# Patient Record
Sex: FEMALE | Race: WHITE | NOT HISPANIC OR LATINO | Employment: FULL TIME | URBAN - METROPOLITAN AREA
[De-identification: names, ages, dates, MRNs, and addresses within clinical notes are randomized per-mention and may not be internally consistent; named-entity substitution may affect disease eponyms.]

---

## 2017-01-09 ENCOUNTER — ALLSCRIPTS OFFICE VISIT (OUTPATIENT)
Dept: OTHER | Facility: OTHER | Age: 47
End: 2017-01-09

## 2017-01-09 DIAGNOSIS — R05.9 COUGH: ICD-10-CM

## 2017-01-09 DIAGNOSIS — R07.9 CHEST PAIN: ICD-10-CM

## 2017-01-10 ENCOUNTER — HOSPITAL ENCOUNTER (OUTPATIENT)
Dept: RADIOLOGY | Facility: HOSPITAL | Age: 47
Discharge: HOME | End: 2017-01-10
Payer: COMMERCIAL

## 2017-01-10 ENCOUNTER — TRANSCRIBE ORDERS (OUTPATIENT)
Dept: ADMINISTRATIVE | Facility: HOSPITAL | Age: 47
End: 2017-01-10

## 2017-01-10 DIAGNOSIS — R07.9 CHEST PAIN: ICD-10-CM

## 2017-01-10 DIAGNOSIS — R05.9 COUGH: ICD-10-CM

## 2017-01-10 LAB
FLUAV AG SPEC QL IA: NEGATIVE
INFLUENZA B AG (HISTORICAL): NEGATIVE

## 2017-01-10 PROCEDURE — 71020 HB CHEST X-RAY 2VW FRONTAL&LATL: CPT

## 2017-08-05 ENCOUNTER — TRANSCRIBE ORDERS (OUTPATIENT)
Dept: LAB | Facility: CLINIC | Age: 47
End: 2017-08-05

## 2017-08-05 ENCOUNTER — APPOINTMENT (OUTPATIENT)
Dept: LAB | Facility: CLINIC | Age: 47
End: 2017-08-05
Payer: COMMERCIAL

## 2017-08-05 DIAGNOSIS — Z00.8 HEALTH EXAMINATION IN POPULATION SURVEY: Primary | ICD-10-CM

## 2017-08-05 DIAGNOSIS — Z00.8 HEALTH EXAMINATION IN POPULATION SURVEY: ICD-10-CM

## 2017-08-05 LAB
CHOLEST SERPL-MCNC: 148 MG/DL (ref 50–200)
EST. AVERAGE GLUCOSE BLD GHB EST-MCNC: 128 MG/DL
HBA1C MFR BLD: 6.1 % (ref 4.2–6.3)
HDLC SERPL-MCNC: 49 MG/DL (ref 40–60)
LDLC SERPL CALC-MCNC: 89 MG/DL (ref 0–100)
TRIGL SERPL-MCNC: 48 MG/DL

## 2017-08-05 PROCEDURE — 36415 COLL VENOUS BLD VENIPUNCTURE: CPT

## 2017-08-05 PROCEDURE — 80061 LIPID PANEL: CPT

## 2017-08-05 PROCEDURE — 83036 HEMOGLOBIN GLYCOSYLATED A1C: CPT

## 2018-01-13 NOTE — RESULT NOTES
Verified Results  * US PELVIS COMPLETE (TRANSABDOMINAL AND TRANSVAGINAL) [id number] 01:00PM Danni Dowling     Test Name Result Flag Reference   US PELVIS COMPLETE (TRANSABDOMINAL AND TRANSVAGINAL) (Report)     PELVIC ULTRASOUND, COMPLETE     INDICATION: Evaluate IUD  Scheduled for removal           COMPARISON: 11/9/2013  TECHNIQUE:  Transabdominal pelvic ultrasound was performed in sagittal and transverse planes with a curvilinear transducer  Additional transvaginal imaging was performed to better evaluate the endometrium and ovaries  Imaging included volumetric    sweeps as well as traditional still imaging technique  FINDINGS:     UTERUS:   The uterus is anteverted in position, measuring 7 2 x 3 0 x 5 0 cm  Contour and echotexture appear normal  Incidental hyperechoic focus again noted within the myometrium  The cervix shows no suspicious abnormality  ENDOMETRIUM:    Normal caliber of 4 mm  IUD is present and in expected location within the endometrial canal    Homogenous and normal in appearance  OVARIES/ADNEXA:   Right ovary: 2 7 x 2 2 x 2 9 cm  No suspicious right ovarian abnormality  Doppler flow within normal limits  Left ovary: 2 0 x 1 0 x 1 8 cm  No suspicious left ovarian abnormality  Doppler flow within normal limits  No suspicious adnexal mass or loculated collections  There is no free fluid  IMPRESSION:      IUD present within expected location within the endometrial canal           Workstation performed: [id number]     Signed by:    Karla White MD   9/14/16

## 2018-01-14 VITALS
DIASTOLIC BLOOD PRESSURE: 80 MMHG | TEMPERATURE: 99.3 F | RESPIRATION RATE: 14 BRPM | SYSTOLIC BLOOD PRESSURE: 130 MMHG | WEIGHT: 236 LBS | BODY MASS INDEX: 34.35 KG/M2 | HEART RATE: 68 BPM

## 2018-06-19 ENCOUNTER — TELEPHONE (OUTPATIENT)
Dept: INTERNAL MEDICINE CLINIC | Facility: CLINIC | Age: 48
End: 2018-06-19

## 2018-06-19 DIAGNOSIS — Z98.84 STATUS POST GASTRIC BYPASS FOR OBESITY: ICD-10-CM

## 2018-06-19 DIAGNOSIS — E55.9 VITAMIN D DEFICIENCY: ICD-10-CM

## 2018-06-19 DIAGNOSIS — I10 HYPERTENSION, UNSPECIFIED TYPE: Primary | ICD-10-CM

## 2018-06-19 DIAGNOSIS — E78.5 HYPERLIPIDEMIA, UNSPECIFIED HYPERLIPIDEMIA TYPE: ICD-10-CM

## 2018-06-19 NOTE — TELEPHONE ENCOUNTER
She needs CBC with diff, chemistry profile, cholesterol, HDL, LDL, triglycerides, zinc level, copper level, PTH-N terminal, folic acid level, iron, ferritin -8 hour fast with diagnoses of hypertension, hyperlipidemia, status post gastric bypass surgery

## 2018-06-19 NOTE — TELEPHONE ENCOUNTER
PT called requesting a lab slip for her next appointment        Please advise  Write them down here with dx codes

## 2018-06-30 ENCOUNTER — TRANSCRIBE ORDERS (OUTPATIENT)
Dept: LAB | Facility: CLINIC | Age: 48
End: 2018-06-30

## 2018-06-30 ENCOUNTER — APPOINTMENT (OUTPATIENT)
Dept: LAB | Facility: CLINIC | Age: 48
End: 2018-06-30
Payer: COMMERCIAL

## 2018-06-30 DIAGNOSIS — I10 HYPERTENSION, UNSPECIFIED TYPE: ICD-10-CM

## 2018-06-30 DIAGNOSIS — Z00.8 HEALTH EXAMINATION IN POPULATION SURVEY: Primary | ICD-10-CM

## 2018-06-30 DIAGNOSIS — Z98.84 STATUS POST GASTRIC BYPASS FOR OBESITY: ICD-10-CM

## 2018-06-30 DIAGNOSIS — Z00.8 HEALTH EXAMINATION IN POPULATION SURVEY: ICD-10-CM

## 2018-06-30 DIAGNOSIS — E55.9 VITAMIN D DEFICIENCY: ICD-10-CM

## 2018-06-30 DIAGNOSIS — E78.5 HYPERLIPIDEMIA, UNSPECIFIED HYPERLIPIDEMIA TYPE: ICD-10-CM

## 2018-06-30 LAB
25(OH)D3 SERPL-MCNC: 20 NG/ML (ref 30–100)
ALBUMIN SERPL BCP-MCNC: 3.4 G/DL (ref 3.5–5)
ALP SERPL-CCNC: 99 U/L (ref 46–116)
ALT SERPL W P-5'-P-CCNC: 24 U/L (ref 12–78)
ANION GAP SERPL CALCULATED.3IONS-SCNC: 6 MMOL/L (ref 4–13)
AST SERPL W P-5'-P-CCNC: 17 U/L (ref 5–45)
BASOPHILS # BLD AUTO: 0.03 THOUSANDS/ΜL (ref 0–0.1)
BASOPHILS NFR BLD AUTO: 0 % (ref 0–1)
BILIRUB SERPL-MCNC: 0.7 MG/DL (ref 0.2–1)
BUN SERPL-MCNC: 11 MG/DL (ref 5–25)
CALCIUM SERPL-MCNC: 8.8 MG/DL (ref 8.3–10.1)
CHLORIDE SERPL-SCNC: 104 MMOL/L (ref 100–108)
CHOLEST SERPL-MCNC: 179 MG/DL (ref 50–200)
CO2 SERPL-SCNC: 30 MMOL/L (ref 21–32)
CREAT SERPL-MCNC: 0.68 MG/DL (ref 0.6–1.3)
EOSINOPHIL # BLD AUTO: 0.11 THOUSAND/ΜL (ref 0–0.61)
EOSINOPHIL NFR BLD AUTO: 2 % (ref 0–6)
ERYTHROCYTE [DISTWIDTH] IN BLOOD BY AUTOMATED COUNT: 14.8 % (ref 11.6–15.1)
EST. AVERAGE GLUCOSE BLD GHB EST-MCNC: 123 MG/DL
FERRITIN SERPL-MCNC: 8 NG/ML (ref 8–388)
FOLATE SERPL-MCNC: 14.9 NG/ML (ref 3.1–17.5)
GFR SERPL CREATININE-BSD FRML MDRD: 106 ML/MIN/1.73SQ M
GLUCOSE P FAST SERPL-MCNC: 97 MG/DL (ref 65–99)
HBA1C MFR BLD: 5.9 % (ref 4.2–6.3)
HCT VFR BLD AUTO: 38.5 % (ref 34.8–46.1)
HDLC SERPL-MCNC: 55 MG/DL (ref 40–60)
HGB BLD-MCNC: 12.3 G/DL (ref 11.5–15.4)
IRON SERPL-MCNC: 72 UG/DL (ref 50–170)
LDLC SERPL CALC-MCNC: 114 MG/DL (ref 0–100)
LDLC SERPL DIRECT ASSAY-MCNC: 109 MG/DL (ref 0–100)
LYMPHOCYTES # BLD AUTO: 2.15 THOUSANDS/ΜL (ref 0.6–4.47)
LYMPHOCYTES NFR BLD AUTO: 30 % (ref 14–44)
MCH RBC QN AUTO: 27.5 PG (ref 26.8–34.3)
MCHC RBC AUTO-ENTMCNC: 31.9 G/DL (ref 31.4–37.4)
MCV RBC AUTO: 86 FL (ref 82–98)
MONOCYTES # BLD AUTO: 0.43 THOUSAND/ΜL (ref 0.17–1.22)
MONOCYTES NFR BLD AUTO: 6 % (ref 4–12)
NEUTROPHILS # BLD AUTO: 4.52 THOUSANDS/ΜL (ref 1.85–7.62)
NEUTS SEG NFR BLD AUTO: 63 % (ref 43–75)
NONHDLC SERPL-MCNC: 124 MG/DL
PLATELET # BLD AUTO: 278 THOUSANDS/UL (ref 149–390)
PMV BLD AUTO: 12.2 FL (ref 8.9–12.7)
POTASSIUM SERPL-SCNC: 3.9 MMOL/L (ref 3.5–5.3)
PROT SERPL-MCNC: 7 G/DL (ref 6.4–8.2)
PTH-INTACT SERPL-MCNC: 63.7 PG/ML (ref 18.4–80.1)
RBC # BLD AUTO: 4.48 MILLION/UL (ref 3.81–5.12)
SODIUM SERPL-SCNC: 140 MMOL/L (ref 136–145)
TRIGL SERPL-MCNC: 50 MG/DL
WBC # BLD AUTO: 7.24 THOUSAND/UL (ref 4.31–10.16)

## 2018-06-30 PROCEDURE — 83721 ASSAY OF BLOOD LIPOPROTEIN: CPT

## 2018-06-30 PROCEDURE — 82306 VITAMIN D 25 HYDROXY: CPT

## 2018-06-30 PROCEDURE — 80061 LIPID PANEL: CPT

## 2018-06-30 PROCEDURE — 83970 ASSAY OF PARATHORMONE: CPT

## 2018-06-30 PROCEDURE — 36415 COLL VENOUS BLD VENIPUNCTURE: CPT

## 2018-06-30 PROCEDURE — 83036 HEMOGLOBIN GLYCOSYLATED A1C: CPT

## 2018-06-30 PROCEDURE — 85025 COMPLETE CBC W/AUTO DIFF WBC: CPT

## 2018-06-30 PROCEDURE — 82728 ASSAY OF FERRITIN: CPT

## 2018-06-30 PROCEDURE — 82525 ASSAY OF COPPER: CPT

## 2018-06-30 PROCEDURE — 84630 ASSAY OF ZINC: CPT

## 2018-06-30 PROCEDURE — 82746 ASSAY OF FOLIC ACID SERUM: CPT

## 2018-06-30 PROCEDURE — 83540 ASSAY OF IRON: CPT

## 2018-06-30 PROCEDURE — 80053 COMPREHEN METABOLIC PANEL: CPT

## 2018-07-03 LAB
COPPER SERPL-MCNC: 121 UG/DL (ref 72–166)
ZINC SERPL-MCNC: 72 UG/DL (ref 56–134)

## 2018-07-11 ENCOUNTER — OFFICE VISIT (OUTPATIENT)
Dept: INTERNAL MEDICINE CLINIC | Facility: CLINIC | Age: 48
End: 2018-07-11
Payer: COMMERCIAL

## 2018-07-11 VITALS
RESPIRATION RATE: 14 BRPM | HEART RATE: 72 BPM | DIASTOLIC BLOOD PRESSURE: 78 MMHG | SYSTOLIC BLOOD PRESSURE: 128 MMHG | BODY MASS INDEX: 37.18 KG/M2 | WEIGHT: 255.4 LBS

## 2018-07-11 DIAGNOSIS — D64.9 ANEMIA, UNSPECIFIED TYPE: ICD-10-CM

## 2018-07-11 DIAGNOSIS — E61.1 IRON DEFICIENCY: Chronic | ICD-10-CM

## 2018-07-11 DIAGNOSIS — Z98.84 STATUS POST GASTRIC BYPASS FOR OBESITY: Primary | ICD-10-CM

## 2018-07-11 DIAGNOSIS — E78.5 HYPERLIPIDEMIA, UNSPECIFIED HYPERLIPIDEMIA TYPE: ICD-10-CM

## 2018-07-11 DIAGNOSIS — R73.03 PREDIABETES: ICD-10-CM

## 2018-07-11 DIAGNOSIS — R73.03 PRE-DIABETES: Chronic | ICD-10-CM

## 2018-07-11 PROCEDURE — 99214 OFFICE O/P EST MOD 30 MIN: CPT | Performed by: INTERNAL MEDICINE

## 2018-07-11 PROCEDURE — 1036F TOBACCO NON-USER: CPT | Performed by: INTERNAL MEDICINE

## 2018-07-11 NOTE — PROGRESS NOTES
Assessment/Plan:  1  Difficulty hearing-she will potentially be scheduling audiogram with ENT physician  2  Previously noted right upper quadrant pain  Ultrasound negative  White count was mildly elevated  CT scan showed changes of hepatic flexure-rule out atypical presentation diverticulitis  Follow-up colonoscopy shows mild diverticular disease  3-diverticulosis-follow-up colonoscopy due 10 years after her last-she follows appropriate diet  4  Previous noted abnormal CT scan with several lumbar vertebral bodies  Unchanged when compared to CT scan a decade prior  Radiology felt there were no discrete bony lytic lesions in that they were more likely potential vertebral body hemangiomas  MRI of lumbar spine shows multilevel degenerative changes, transitional lumbosacral junction changes, foraminal stenosis and marrow abnormalities unchanged dating back to 2004  5  Previously described other abdominal pain  Has had prior May-en-Y gastric bypass surgery  Ultrasound negative  Labs normal   I was worried about potential marginal ulcer-she took PPI for 2 months in all symptoms resolved and has not recurred  6  Hypertension-not an issue since her bypass surgery  7  Hyperlipidemia-resolved since her bypass surgery  8  Fatty liver-liver enzymes normal with weight loss post gastric bypass surgery  9  Iron deficiency-associated with post gastric bypass-she will take her vitamin-C every other day with supplemental vitamin Celsius  10  Vitamin-D deficiency-associated with her gastric bypass-she will take 2000 units daily +50 1000 units monthly  11  Vitamin B12 deficiency-on supplement  12    Pre diabetes-she knows further weight loss needed    All other problems as per note November 2014      MEDICAL REGIMEN:      Multivitamin, ferrous sulfate 325 milligrams every other day, vitamin-C 500 milligrams every other day, vitamin D3/2000 units daily, vitamin-D/25839 units monthly    Appointment in 12 months with prior chemistry profile cholesterol profile hemoglobin A1c, iron, ferritin, PTH level, copper, zinc, PTH level, vitamin B1, vitamin B6, vitamin B12 level, copper level, zinc level  No problem-specific Assessment & Plan notes found for this encounter  Diagnoses and all orders for this visit:    Status post gastric bypass for obesity  -     CBC and differential; Future  -     Comprehensive metabolic panel; Future  -     HEMOGLOBIN A1C W/ EAG ESTIMATION; Future  -     Cholesterol, total; Future  -     Triglycerides; Future  -     HDL cholesterol; Future  -     LDL cholesterol, direct; Future  -     TSH, 3rd generation; Future  -     Vitamin B12; Future  -     Ferritin; Future  -     Iron; Future  -     Vitamin D 25 hydroxy; Future  -     PTH, intact; Future  -     Vitamin B1, whole blood; Future  -     Vitamin B6; Future  -     Copper Level; Future  -     Zinc; Future    Prediabetes  -     CBC and differential; Future  -     Comprehensive metabolic panel; Future  -     HEMOGLOBIN A1C W/ EAG ESTIMATION; Future  -     Cholesterol, total; Future  -     Triglycerides; Future  -     HDL cholesterol; Future  -     LDL cholesterol, direct; Future  -     TSH, 3rd generation; Future  -     Vitamin B12; Future  -     Ferritin; Future  -     Iron; Future  -     Vitamin D 25 hydroxy; Future  -     PTH, intact; Future  -     Vitamin B1, whole blood; Future  -     Vitamin B6; Future  -     Copper Level; Future  -     Zinc; Future    Hyperlipidemia, unspecified hyperlipidemia type  -     CBC and differential; Future  -     Comprehensive metabolic panel; Future  -     HEMOGLOBIN A1C W/ EAG ESTIMATION; Future  -     Cholesterol, total; Future  -     Triglycerides; Future  -     HDL cholesterol; Future  -     LDL cholesterol, direct; Future  -     TSH, 3rd generation; Future  -     Vitamin B12; Future  -     Ferritin; Future  -     Iron; Future  -     Vitamin D 25 hydroxy; Future  -     PTH, intact;  Future  -     Vitamin B1, whole blood; Future  -     Vitamin B6; Future  -     Copper Level; Future  -     Zinc; Future    Anemia, unspecified type    Iron deficiency    Pre-diabetes          Subjective:      Patient ID: Susie Mccormack is a 41 y o  female  She is overall relatively stable  She has deficiencies associated with her post gastric bypass state  She is low on vitamin-D but has not been completely compliant with taking her supplement  She will be on vitamin D3/2000 units a day +50 1000 units monthly  In addition she is deficient on iron  We reviewed the literature showing iron absorption is better with every other day supplement and she will be doing this  She will supplement with vitamin-C  Labs done prior to this visit show cholesterol 179 triglycerides 50 HDL 55  A1c 5 9 vitamin-D low at 20 ferritin is equal to 8 iron 72 folate 14 9 PTH is normal at 63 7 copper is normal zinc is normal   She is aware the remote potential for heavy metal deficiency status post gastric bypass  Checking heavy metal levels intermittently    She has a history of hypertension  This is resolved since her bypass surgery  No evidence of this today  She has a history of fatty liver with elevated liver enzymes-these normalized with weight loss post gastric bypass  Liver enzymes prior to this visit normal      This patient denies any systemic symptoms  Specifically there has been no evidence of fever, night sweats, significant weight loss or significant decrease in appetite  We reviewed the literature showing week of iron deficiency with decreased hearing  She is having some issues with hearing and will be potentially seen the ENT for repeat audiogram   Audiogram in the past was normal   She says she has trouble hearing in crowds no trouble hearing on the phone which she can concentrate          The following portions of the patient's history were reviewed and updated as appropriate: current medications, past family history, past medical history, past social history, past surgical history and problem list     Review of Systems   Constitutional: Negative  HENT: Positive for hearing loss  Respiratory: Negative  Cardiovascular: Negative  Gastrointestinal: Negative  Endocrine: Negative  Genitourinary: Negative  Musculoskeletal: Negative  Neurological: Negative  Hematological: Negative  Psychiatric/Behavioral: Negative  Objective:      /78   Pulse 72   Resp 14   Wt 116 kg (255 lb 6 4 oz)   BMI 37 18 kg/m²          Physical Exam   Constitutional: She is oriented to person, place, and time  She appears well-developed and well-nourished  No distress  HENT:   Head: Normocephalic and atraumatic  Right Ear: External ear normal    Left Ear: External ear normal    Nose: Nose normal    Mouth/Throat: Oropharynx is clear and moist  No oropharyngeal exudate  Eyes: Conjunctivae and EOM are normal  Pupils are equal, round, and reactive to light  Right eye exhibits no discharge  Left eye exhibits no discharge  No scleral icterus  Neck: Normal range of motion  Neck supple  No JVD present  No tracheal deviation present  No thyromegaly present  Cardiovascular: Normal rate, regular rhythm, normal heart sounds and intact distal pulses  Exam reveals no gallop and no friction rub  No murmur heard  Pulmonary/Chest: Effort normal and breath sounds normal  No respiratory distress  She has no wheezes  She has no rales  She exhibits no tenderness  Abdominal: Soft  Bowel sounds are normal  She exhibits no distension and no mass  There is no tenderness  There is no rebound and no guarding  Musculoskeletal: Normal range of motion  She exhibits no edema or deformity  Lymphadenopathy:     She has no cervical adenopathy  Neurological: She is alert and oriented to person, place, and time  She has normal reflexes  No cranial nerve deficit  She exhibits normal muscle tone   Coordination normal    Skin: Skin is warm and dry  No rash noted  No erythema  Psychiatric: She has a normal mood and affect  Her behavior is normal  Judgment and thought content normal    Vitals reviewed

## 2019-02-22 ENCOUNTER — APPOINTMENT (OUTPATIENT)
Dept: RADIOLOGY | Facility: CLINIC | Age: 49
End: 2019-02-22
Payer: COMMERCIAL

## 2019-02-22 ENCOUNTER — OFFICE VISIT (OUTPATIENT)
Dept: OBGYN CLINIC | Facility: CLINIC | Age: 49
End: 2019-02-22
Payer: COMMERCIAL

## 2019-02-22 VITALS
SYSTOLIC BLOOD PRESSURE: 139 MMHG | HEART RATE: 93 BPM | WEIGHT: 262.8 LBS | BODY MASS INDEX: 37.62 KG/M2 | HEIGHT: 70 IN | DIASTOLIC BLOOD PRESSURE: 90 MMHG

## 2019-02-22 DIAGNOSIS — M72.2 PLANTAR FASCIITIS: ICD-10-CM

## 2019-02-22 DIAGNOSIS — M79.672 PAIN IN LEFT FOOT: ICD-10-CM

## 2019-02-22 DIAGNOSIS — S99.922A INJURY OF PLANTAR PLATE OF LEFT FOOT, INITIAL ENCOUNTER: Primary | ICD-10-CM

## 2019-02-22 DIAGNOSIS — M79.671 HEEL PAIN, BILATERAL: ICD-10-CM

## 2019-02-22 DIAGNOSIS — M79.672 HEEL PAIN, BILATERAL: ICD-10-CM

## 2019-02-22 PROCEDURE — 99203 OFFICE O/P NEW LOW 30 MIN: CPT | Performed by: ORTHOPAEDIC SURGERY

## 2019-02-22 PROCEDURE — 73630 X-RAY EXAM OF FOOT: CPT

## 2019-02-22 RX ORDER — ERGOCALCIFEROL 1.25 MG/1
CAPSULE ORAL
COMMUNITY
Start: 2019-01-08 | End: 2020-07-09 | Stop reason: SDUPTHER

## 2019-02-22 RX ORDER — ERGOCALCIFEROL 1.25 MG/1
CAPSULE ORAL
COMMUNITY
Start: 2011-03-21 | End: 2020-07-09

## 2019-02-22 NOTE — PROGRESS NOTES
MARIMAR Peterson  Attending, Orthopaedic Surgery  [facility] [address] [facility]      ORTHOPAEDIC FOOT [facility]KLE CLINIC VISIT     Assessment:     Encounter Diagnoses   Name Primary?  Pain in left foot     Heel pain, bilateral     Injury of plantar plate of left foot, initial encounter Yes    Plantar fasciitis             Plan:   · The patient verbalized understanding of exam findings and treatment plan  We engaged in the shared decision-making process and treatment options were discussed at length with the patient  Surgical and conservative management discussed today along with risks and benefits  · She is to wear a budin splint on left 2nd toe with proper shoe wear  Her 2nd toe pain should improve with this treatment  · She does not have typical pain for plantar fasciitis, her pain is directly medial on the heel and not plantar  If conservative treatment does not alleviate her symptoms we will consider MRI to evaluate the area  · She is to wear visco heel cups  · Activity to tolerance  · She may take NSAIDS for pain control as needed  Return in about 3 months (around 5/22/2019) for Recheck left foot 2nd toe, plantar plate injury  Kin Puente History of Present Illness:   Chief Complaint:   Chief Complaint   Patient presents with   [address] [place] [address] is a 57 y o  female who is being seen for left foot pain x 3 months  She denies any injury mechansim  Pain is localized at 2nd toe and medial heel with minimal radiating and described as sharp and severe  Patient denies numbness, tingling or radicular pain  Denies history of neuropathy  Patient does not smoke, does not have diabetes and does not take blood thinners  Patient denies family history of anesthesia complications and has not had any complications with anesthesia       Pain/symptom timing:  Worse during the day when active  Pain/symptom context:  Worse with activites and work  Pain/symptom modifying factors:  Rest makes better, activities make worse  Pain/symptom associated signs/symptoms: none    Prior treatment   · NSAIDsYes   · Injections No   · Bracing/Orthotics No    · Physical Therapy No     Orthopedic Surgical History:   See below    Past Medical, Surgical and Social History:  Past Medical History:  has a past medical history of Controlled diabetes mellitus ([facility] [address] ) and Long term use of drug  Problem List: does not have any pertinent problems on file  Past Surgical History:  has a past surgical history that includes Gastric bypass (03/29/2011) and Tonsillectomy  Family History: family history is not on file  Social History:  reports that she has never smoked  She has never used smokeless tobacco  She reports that she does not drink alcohol  Her drug history is not on file  Current Medications: has a current medication list which includes the following prescription(s): ergocalciferol, ergocalciferol, and multiple vitamin  Allergies: has No Known Allergies  Review of Systems:  General- denies fever/chills  HEENT- denies hearing loss or sore throat  Eyes- denies eye pain or visual disturbances, denies red eyes  Respiratory- denies cough or SOB  Cardio- denies chest pain or palpitations  GI- denies abdominal pain  Endocrine- denies urinary frequency  Urinary- denies pain with urination  Musculoskeletal- Negative except noted above  Skin- denies rashes or wounds  Neurological- denies dizziness or headache  Psychiatric- denies anxiety or difficulty concentrating    Physical Exam:   /90 (BP Location: Right arm, Patient Position: Sitting, Cuff Size: Adult)   Pulse 93   Ht 5' 9 5" (1 765 m)   Wt 119 kg (262 lb 12 8 oz)   BMI 38 25 kg/m²   General/Constitutional: No apparent distress: well-nourished and well developed    Eyes: normal ocular motion  Lymphatic: No appreciable lymphadenopathy  Respiratory: Non-labored breathing  Vascular: No edema, swelling or tenderness, except as noted in detailed exam   Integumentary: No impressive skin lesions present, except as noted in detailed exam   Neuro: No ataxia or tremors noted  Psych: Normal mood and affect, oriented to person, place and time  Appropriate affect  Musculoskeletal: Normal, except as noted in detailed exam and in HPI  Examination    Left    Gait Normal   Musculoskeletal Tender to palpation at 2nd toe proximal phalanx  Direct medial heel pain  Skin   Callus formation present of the 5th mtpj, base of the 5th metatarsal, plantar  Nails Normal    Range of Motion  full degrees dorsiflexion, full degrees plantarflexion of the 2nd mtpj  Subtalar motion: wnl    Stability Stable    Muscle Strength 5/5 tibialis anterior  5/5 gastrocnemius-soleus  5/5 posterior tibialis  5/5 peroneal/eversion strength  5/5 EHL  5/5 FHL    Neurologic Normal    Sensation Intact to light touch throughout sural, saphenous, superficial peroneal, deep peroneal and medial/lateral plantar nerve distributions  Macon-Gena 5 07 filament (10g) testing deferred  Cardiovascular Brisk capillary refill < 2 seconds,intact DP and PT pulses    Special Tests Lamont's Click:  Negative of the 2nd web  Anterior drawer 2nd toe:  negative      Imaging Studies:   3 views of the left foot were taken, reviewed and interpreted independently that demonstrate no fracture or dislocation, pes planus foot structure with Meary's Line of 15 6 degrees  Reviewed by me personally  Scribe Attestation    I,:   Ashlyn Chapman am acting as a scribe while in the presence of the attending physician :        I,:   Nicole Rose MD personally performed the services described in this documentation    as scribed in my presence :            Cleon Ginsberg Lachman, MD  Foot & Ankle Surgery   Department [address]      I personally performed the service  Cleon Ginsberg Lachman, MD

## 2019-04-01 PROBLEM — L98.9: Status: ACTIVE | Noted: 2019-04-01

## 2019-04-01 PROCEDURE — 88305 TISSUE EXAM BY PATHOLOGIST: CPT | Performed by: PATHOLOGY

## 2019-07-03 ENCOUNTER — APPOINTMENT (OUTPATIENT)
Dept: LAB | Facility: CLINIC | Age: 49
End: 2019-07-03
Payer: COMMERCIAL

## 2019-07-03 DIAGNOSIS — R73.03 PREDIABETES: ICD-10-CM

## 2019-07-03 DIAGNOSIS — Z98.84 STATUS POST GASTRIC BYPASS FOR OBESITY: ICD-10-CM

## 2019-07-03 DIAGNOSIS — E78.5 HYPERLIPIDEMIA, UNSPECIFIED HYPERLIPIDEMIA TYPE: ICD-10-CM

## 2019-07-03 LAB
25(OH)D3 SERPL-MCNC: 27.6 NG/ML (ref 30–100)
ALBUMIN SERPL BCP-MCNC: 3.7 G/DL (ref 3.5–5)
ALP SERPL-CCNC: 101 U/L (ref 46–116)
ALT SERPL W P-5'-P-CCNC: 24 U/L (ref 12–78)
ANION GAP SERPL CALCULATED.3IONS-SCNC: 11 MMOL/L (ref 4–13)
AST SERPL W P-5'-P-CCNC: 20 U/L (ref 5–45)
BASOPHILS # BLD AUTO: 0.03 THOUSANDS/ΜL (ref 0–0.1)
BASOPHILS NFR BLD AUTO: 0 % (ref 0–1)
BILIRUB SERPL-MCNC: 0.6 MG/DL (ref 0.2–1)
BUN SERPL-MCNC: 9 MG/DL (ref 5–25)
CALCIUM SERPL-MCNC: 8.8 MG/DL (ref 8.3–10.1)
CHLORIDE SERPL-SCNC: 104 MMOL/L (ref 100–108)
CHOLEST SERPL-MCNC: 162 MG/DL (ref 50–200)
CO2 SERPL-SCNC: 24 MMOL/L (ref 21–32)
CREAT SERPL-MCNC: 0.76 MG/DL (ref 0.6–1.3)
EOSINOPHIL # BLD AUTO: 0.1 THOUSAND/ΜL (ref 0–0.61)
EOSINOPHIL NFR BLD AUTO: 1 % (ref 0–6)
ERYTHROCYTE [DISTWIDTH] IN BLOOD BY AUTOMATED COUNT: 13.4 % (ref 11.6–15.1)
EST. AVERAGE GLUCOSE BLD GHB EST-MCNC: 120 MG/DL
FERRITIN SERPL-MCNC: 13 NG/ML (ref 8–388)
GFR SERPL CREATININE-BSD FRML MDRD: 94 ML/MIN/1.73SQ M
GLUCOSE P FAST SERPL-MCNC: 107 MG/DL (ref 65–99)
HBA1C MFR BLD: 5.8 % (ref 4.2–6.3)
HCT VFR BLD AUTO: 43.6 % (ref 34.8–46.1)
HDLC SERPL-MCNC: 50 MG/DL (ref 40–60)
HGB BLD-MCNC: 14.1 G/DL (ref 11.5–15.4)
IMM GRANULOCYTES # BLD AUTO: 0.03 THOUSAND/UL (ref 0–0.2)
IMM GRANULOCYTES NFR BLD AUTO: 0 % (ref 0–2)
IRON SERPL-MCNC: 88 UG/DL (ref 50–170)
LDLC SERPL DIRECT ASSAY-MCNC: 105 MG/DL (ref 0–100)
LYMPHOCYTES # BLD AUTO: 1.94 THOUSANDS/ΜL (ref 0.6–4.47)
LYMPHOCYTES NFR BLD AUTO: 19 % (ref 14–44)
MCH RBC QN AUTO: 28.8 PG (ref 26.8–34.3)
MCHC RBC AUTO-ENTMCNC: 32.3 G/DL (ref 31.4–37.4)
MCV RBC AUTO: 89 FL (ref 82–98)
MONOCYTES # BLD AUTO: 0.57 THOUSAND/ΜL (ref 0.17–1.22)
MONOCYTES NFR BLD AUTO: 6 % (ref 4–12)
NEUTROPHILS # BLD AUTO: 7.5 THOUSANDS/ΜL (ref 1.85–7.62)
NEUTS SEG NFR BLD AUTO: 74 % (ref 43–75)
NRBC BLD AUTO-RTO: 0 /100 WBCS
PLATELET # BLD AUTO: 261 THOUSANDS/UL (ref 149–390)
PMV BLD AUTO: 12 FL (ref 8.9–12.7)
POTASSIUM SERPL-SCNC: 4.4 MMOL/L (ref 3.5–5.3)
PROT SERPL-MCNC: 7.6 G/DL (ref 6.4–8.2)
PTH-INTACT SERPL-MCNC: 48.1 PG/ML (ref 18.4–80.1)
RBC # BLD AUTO: 4.89 MILLION/UL (ref 3.81–5.12)
SODIUM SERPL-SCNC: 139 MMOL/L (ref 136–145)
TRIGL SERPL-MCNC: 62 MG/DL
TSH SERPL DL<=0.05 MIU/L-ACNC: 3.54 UIU/ML (ref 0.36–3.74)
VIT B12 SERPL-MCNC: 208 PG/ML (ref 100–900)
WBC # BLD AUTO: 10.17 THOUSAND/UL (ref 4.31–10.16)

## 2019-07-03 PROCEDURE — 84425 ASSAY OF VITAMIN B-1: CPT

## 2019-07-03 PROCEDURE — 83036 HEMOGLOBIN GLYCOSYLATED A1C: CPT

## 2019-07-03 PROCEDURE — 82607 VITAMIN B-12: CPT

## 2019-07-03 PROCEDURE — 83970 ASSAY OF PARATHORMONE: CPT

## 2019-07-03 PROCEDURE — 83721 ASSAY OF BLOOD LIPOPROTEIN: CPT

## 2019-07-03 PROCEDURE — 36415 COLL VENOUS BLD VENIPUNCTURE: CPT

## 2019-07-03 PROCEDURE — 80061 LIPID PANEL: CPT

## 2019-07-03 PROCEDURE — 84630 ASSAY OF ZINC: CPT

## 2019-07-03 PROCEDURE — 82525 ASSAY OF COPPER: CPT

## 2019-07-03 PROCEDURE — 84443 ASSAY THYROID STIM HORMONE: CPT

## 2019-07-03 PROCEDURE — 85025 COMPLETE CBC W/AUTO DIFF WBC: CPT

## 2019-07-03 PROCEDURE — 84207 ASSAY OF VITAMIN B-6: CPT

## 2019-07-03 PROCEDURE — 83540 ASSAY OF IRON: CPT

## 2019-07-03 PROCEDURE — 82306 VITAMIN D 25 HYDROXY: CPT

## 2019-07-03 PROCEDURE — 82728 ASSAY OF FERRITIN: CPT

## 2019-07-03 PROCEDURE — 80053 COMPREHEN METABOLIC PANEL: CPT

## 2019-07-06 LAB
COPPER SERPL-MCNC: 89 UG/DL (ref 72–166)
ZINC SERPL-MCNC: 73 UG/DL (ref 56–134)

## 2019-07-09 LAB
VIT B1 BLD-SCNC: 138.6 NMOL/L (ref 66.5–200)
VIT B6 SERPL-MCNC: 3.3 UG/L (ref 2–32.8)

## 2019-07-10 ENCOUNTER — OFFICE VISIT (OUTPATIENT)
Dept: INTERNAL MEDICINE CLINIC | Facility: CLINIC | Age: 49
End: 2019-07-10
Payer: COMMERCIAL

## 2019-07-10 ENCOUNTER — TELEPHONE (OUTPATIENT)
Dept: INTERNAL MEDICINE CLINIC | Facility: CLINIC | Age: 49
End: 2019-07-10

## 2019-07-10 VITALS
HEART RATE: 78 BPM | SYSTOLIC BLOOD PRESSURE: 120 MMHG | HEIGHT: 70 IN | BODY MASS INDEX: 37.68 KG/M2 | WEIGHT: 263.2 LBS | DIASTOLIC BLOOD PRESSURE: 70 MMHG | RESPIRATION RATE: 14 BRPM

## 2019-07-10 DIAGNOSIS — C44.619 BASAL CELL CARCINOMA (BCC) OF SKIN OF LEFT UPPER EXTREMITY INCLUDING SHOULDER: ICD-10-CM

## 2019-07-10 DIAGNOSIS — E78.5 HYPERLIPIDEMIA, UNSPECIFIED HYPERLIPIDEMIA TYPE: Chronic | ICD-10-CM

## 2019-07-10 DIAGNOSIS — D72.829 LEUKOCYTOSIS, UNSPECIFIED TYPE: ICD-10-CM

## 2019-07-10 DIAGNOSIS — Z12.39 BREAST CANCER SCREENING: ICD-10-CM

## 2019-07-10 DIAGNOSIS — R73.03 PRE-DIABETES: Chronic | ICD-10-CM

## 2019-07-10 DIAGNOSIS — E61.1 IRON DEFICIENCY: Chronic | ICD-10-CM

## 2019-07-10 DIAGNOSIS — Z98.84 STATUS POST GASTRIC BYPASS FOR OBESITY: Primary | Chronic | ICD-10-CM

## 2019-07-10 PROCEDURE — 99214 OFFICE O/P EST MOD 30 MIN: CPT | Performed by: INTERNAL MEDICINE

## 2019-07-10 RX ORDER — FERROUS SULFATE 325(65) MG
325 TABLET ORAL EVERY OTHER DAY
COMMUNITY
End: 2020-07-09 | Stop reason: SDUPTHER

## 2019-07-10 NOTE — PROGRESS NOTES
Assessment/Plan:   1  Basal cell carcinoma left forearm -as noted pathology read as superficial basal cell carcinoma with small foci of tumor seen at 1 of the tips of the ellipse-other margins free of tremor -she is concerned about potential for other issues and I recommended formal evaluation by Dermatology  2  Difficulty hearing -we had recommended audiogram with ENT valve but she defers  We reviewed potential exacerbation by iron deficiency -she will be more thorough with her iron supplementation  3  Previously noted right upper quadrant pain  Ultrasound negative  White count was mildly elevated  CT scan showed changes of hepatic flexure-rule out atypical presentation diverticulitis  Follow-up colonoscopy shows mild diverticular disease  1-hoyrwmssbyttmv-wsvuwn-up colonoscopy due 10 years after her last-she follows appropriate diet  5  Previous noted abnormal CT scan with several lumbar vertebral bodies  Unchanged when compared to CT scan a decade prior  Radiology felt there were no discrete bony lytic lesions in that they were more likely potential vertebral body hemangiomas  MRI of lumbar spine shows multilevel degenerative changes, transitional lumbosacral junction changes, foraminal stenosis and marrow abnormalities unchanged dating back to 2004  6  Previously described other abdominal pain  Has had prior May-en-Y gastric bypass surgery  Ultrasound negative  Labs normal   I was worried about potential marginal ulcer-she took PPI for 2 months in all symptoms resolved and has not recurred  7  Hyperlipidemia-resolved since her bypass surgery  8  Fatty liver-liver enzymes normal with weight loss post gastric bypass surgery  9  Iron deficiency-associated with post gastric bypass-she will take her vitamin-C every other day with supplemental vitamin C  10  Vitamin-D deficiency-associated with her  Vitamin daily rather than sporadically+50 1000 units monthly  11    Vitamin B12 deficiency-on supplement  12  Pre diabetes-she knows further weight loss needed  13  Hypertension -not an issue since her bypass surgery   14  Mild leukocytosis - white count 10 17- I recommended repeat CBC in 4 months and then prior to next visit in 12 months  She will have labs in 4 months to include CBC with diff  Prior to  Appointment in 12 months she will have a CBC with diff chemistry profile cholesterol profile A1c PTH zinc copper vitamin B12 level TSH  15  Status post gastric bypass last   16  Seek previously described episodes of feeling poorly -probably related to dumping syndrome  It was documented that  At the time of symptoms she had a blood sugar in the 50s  She knows to eat more often and is smaller amounts  She is aware she should substitute complex carbohydrates and protein for simple carbohydrates  17  Asymptomatic  Carotid stenosis- CONSIDER FOLLOW-UP CAROTID DOPPLER OVER THE NEXT 2 YEARS  18  History of diverticulitis -confirmed that exam as well as radiographic study  Colonoscopy showed mild diverticular disease  19  Esophageal reflux -asymptomatic times months             MEDICAL REGIMEN:                                                  Multivitamin, ferrous sulfate 325 milligrams every other day, vitamin-C 500 milligrams every other day, vitamin D3/2000 units daily, vitamin-D/57489 units monthly    Addendum-seen by covering physician on December 9th with sinusitis - was treated with Augmentin  No problem-specific Assessment & Plan notes found for this encounter  Diagnoses and all orders for this visit:    Status post gastric bypass for obesity    Pre-diabetes    Iron deficiency    Leukocytosis, unspecified type    Other orders  -     ferrous sulfate 325 (65 Fe) mg tablet; Take 325 mg by mouth every other day  -     Ascorbic Acid (VITAMIN C PO); Take by mouth every other day          Subjective:      Patient ID: Kushal Monty is a 57 y o  female  She is overall relatively stable    She did not have an audiogram evaluation by ENT for decreased hearing  Her  feels still an issue  We reviewed potential link between iron deficiency decreased hearing  On her labs she has subtle iron deficiency with an iron of 88 and a ferritin of 13  She will be more faithful with her iron supplement  Other labs show vitamin-D of 27 6 white count 10 17 CBC otherwise normal sugar 107 creatinine 0 76 PTH normal A1c elevated at 5 8 copper normal sake normal cholesterol 162 triglycerides 62 HDL 50  TSH normal vitamin B borderline at 208    Her mother has diabetes  She has pre diabetes  She understands the   Importance of appropriate diet and exercise    She had a skin lesion removed from her left forearm  This was read as basal cell carcinoma  Pathology was read as superficial basal cell carcinoma with small foci of tumor at the tips with other margins free of tumor  She did not have additional surgery for this and because of this I recommended formal evaluation by Dermatology and we are making arrangements for this     She has a history of hypertension  This has not been an issue since her bypass surgery  At this point monitoring  She has a history of hyperlipidemia - this has resolved since her bypass surgery  We had a long discussion today regarding the skin lesion  This was a 25 minutes visit with more than 50% of the time spent counseling the patient formulating a treatment plan  Multiple questions answered      A full skin examination was done today with the special dermatologic light  There was evidence of several benign keratoses  There were no worrisome lesions  I did not see anything to suggest basal cell or squamous cell carcinoma  There was no evidence of malignant melanoma  I reviewed the concept of benign hemangiomas of the skin              The following portions of the patient's history were reviewed and updated as appropriate: allergies, current medications, past family history, past medical history, past social history, past surgical history and problem list     Review of Systems   Constitutional: Negative  Respiratory: Negative  Cardiovascular: Negative  Gastrointestinal: Negative  Endocrine: Negative  Genitourinary: Negative  Musculoskeletal: Negative  Skin: Positive for wound  Neurological: Negative  Hematological: Negative  Psychiatric/Behavioral: Negative  Objective:      Ht 5' 9 5" (1 765 m)   Wt 119 kg (263 lb 3 2 oz)   BMI 38 31 kg/m²          Physical Exam   Constitutional: She is oriented to person, place, and time  She appears well-developed and well-nourished  No distress  obese   HENT:   Head: Normocephalic and atraumatic  Right Ear: External ear normal    Left Ear: External ear normal    Nose: Nose normal    Mouth/Throat: Oropharynx is clear and moist  No oropharyngeal exudate  Eyes: Pupils are equal, round, and reactive to light  Conjunctivae and EOM are normal  Right eye exhibits no discharge  Left eye exhibits no discharge  No scleral icterus  Neck: Normal range of motion  Neck supple  No JVD present  No tracheal deviation present  No thyromegaly present  Cardiovascular: Normal rate, regular rhythm and intact distal pulses  Exam reveals no gallop and no friction rub  No murmur heard  Pulmonary/Chest: Effort normal and breath sounds normal  No respiratory distress  She has no wheezes  She has no rales  She exhibits no tenderness  Abdominal: Soft  Bowel sounds are normal  She exhibits no distension and no mass  There is no tenderness  There is no rebound and no guarding  Musculoskeletal: Normal range of motion  She exhibits no edema or deformity  Lymphadenopathy:     She has no cervical adenopathy  Neurological: She is alert and oriented to person, place, and time  She has normal reflexes  She displays normal reflexes  No cranial nerve deficit  She exhibits normal muscle tone   Coordination normal    Skin: Skin is warm and dry  No rash noted  No erythema  Healed wound of the left forearm -benign nevi   Psychiatric: She has a normal mood and affect  Her behavior is normal  Judgment and thought content normal    Vitals reviewed

## 2019-07-14 NOTE — LETTER
Dear Charity Oropeza,     I am  sending for your evaluation Pam Bee  This patient recently was found to have a nonhealing area of her left forearm which on pathology removal was a basal cell carcinoma  Pathology report was read as a small foci of tumor seen at 1 of the tips of removal    Other margins were free of tumor  She was told by surgery that treatment at present is adequate  I recommended she see you to determine if you feel additional intervention is warranted                     Sincerely,                  MARIMAR Nieves

## 2019-08-13 ENCOUNTER — OFFICE VISIT (OUTPATIENT)
Dept: DERMATOLOGY | Facility: CLINIC | Age: 49
End: 2019-08-13
Payer: COMMERCIAL

## 2019-08-13 VITALS — TEMPERATURE: 98.7 F | HEIGHT: 71 IN | WEIGHT: 263 LBS | BODY MASS INDEX: 36.82 KG/M2

## 2019-08-13 DIAGNOSIS — C44.91 SUPERFICIAL BASAL CELL CARCINOMA: Primary | ICD-10-CM

## 2019-08-13 PROCEDURE — 99244 OFF/OP CNSLTJ NEW/EST MOD 40: CPT | Performed by: DERMATOLOGY

## 2019-08-13 RX ORDER — FLUOROURACIL 50 MG/G
CREAM TOPICAL
Qty: 40 G | Refills: 1 | Status: SHIPPED | OUTPATIENT
Start: 2019-08-13 | End: 2020-07-09

## 2019-08-13 NOTE — PATIENT INSTRUCTIONS
EFUDEX (5-Fluorouracil, 5-U)     1  Efudex is a chemotherapy medication when taken by mouth; however, in dermatology we use it to treat skin conditions such as warts, some skin cancers and most commonly actinic (solar) keratoses  When applied topically the medication is not absorbed into the bloodstream and does not cause typical chemotherapy side effects  It is safe to your body  2  Efudex works by destroying the damaged cells on your skin  As a result it causes redness, irritation, and scabbing  This is a normal part of therapeutic skin response to Efudex  3  The treated areas need to be completely protected from the sun during the treatment and the recovery process  If you need to go out into the sun, cover the area with a hat, long sleeve shirts, gloves, etc    4  Treatment approaches very depending on the condition that is being treated  Your doctor will specify which approach is right for you  Instructions for Use :  Continuous and daily therapy over a region of the skin   ·  This approach is used when you have widespread involvement over an area   ·  Use Efudex twice daily  ·  The duration of treatment depends on the skin condition  Typically it takes 2-3 weeks for the face, 4-6 weeks for the scalp, arms and legs  What to Expect During Therapy   ·  Redness is caused by inflammation and destruction of the abnormal cells and indicates your   lesions are responding to the treatment  You may use an over-the-counter 1% hydrocortisone ointment to decrease redness, although the treatment may be somewhat more effective if you are able to avoid cortisone use  It takes 2-4 weeks for the redness to fade after stopping the Efudex; most of the redness resolves over the first 1-2 weeks  ·  Crusting and peeling occurs as the sun-damaged skin is removed to allow for replacement by normal skin   Cool washcloth soaks (washcloth soaked with cool water over face for 15-20 minutes 3-4 times per day) can help as well as a moisturizing ointment, such as Vaseline or Aquaphor ointment  It is preferable to be moisturized rather than have the skin be dry  It is fine to wash your face with plain water or a mild cleanser such as Dove or Cetaphil during your treatment course  ·  Itching and pain can be controlled with acetaminophen (Tylenol) or non-steroidal anti- inflammatory medication (ibuprofen, naproxen)   In Addition   ·  You can apply the Efudex with a Q-tip (if spot treating), gloves or fingers  If fingers are used, however, remember to wash your hands thoroughly to avoid irritation on normal skin  ·  Care must be taken with Efudex during candy months because the medicine is reactive with the sun  DO NOT USE EFUDEX IN SUMMER MONTHS unless specifically directed to by your doctor  ·  Do NOT use Efudex on eyelids or lips unless specifically directed to do so  ·  Care must be taken with Efudex in skin fold areas like the fold from the nose to the corner of the mouth  Try to avoid having any Efudex accumulate in those areas  ·  Not all bumps will respond to the Efudex  There are benign types of keratoses that will not react to the medication (such as seborrheic keratoses)  If the area treated is not getting red it does not mean the medicine isn't working  ·  Consider the Efudex as an investment in the health of your skin; the benefits of sticking it out are worth it! Efudex is an excellent way to reverse many years of sun damage  When to contact your provider   ·  Once you have completed your prescribed course of therapy, wait for a period of 4 weeks to see if treated areas resolve  If you have lesions that have not responded, make a follow-up appointment with your doctor in about 8 weeks  In evaluating unresponsive areas your doctor will need the skin completely settled down this usually takes a period of at least two months     ·  If having extensive burning, itching, swelling or tenderness call the Dermatology Department to arrange an Efudex check with our nurse, who has extensive experience with Efudex treatment  Although the Efudex treatment sometimes produces dramatic redness, crusting and peeling, problems such as allergic reactions and infection are rare       If you have any questions please call our office at [phone]

## 2019-08-13 NOTE — PROGRESS NOTES
[address] [facility] Note     Patient Name: Tina Sinclair  Encounter Date: 08/13/2019    Today's Chief Concerns:  [facility] Concern #1:  Basal Cell Carcinoma, Left forearm       Past Medical History:  Have you ever had or currently have any of the following medical conditions or treatments? · HIV/AIDS: No  · Hepatitis B: No  · Hepatitis C: No   · Diabetes: No  · Tuberculosis: No  · Biologic Therapy/Chemotherapy: No  · Organ or Bone Marrow Transplantation: No  · Radiation Treatment: No  · Cancer (If Yes, which types)- No      Have you ever had any of the following skin conditions? · Melanoma? (If Yes, please provide more detail)- No  · Basal Cell Carcinoma: YES  · Squamous Cell Carcinoma: No  · Sebaceous Cell Carcinoma: No  · Merkel Cell Carcinoma: No  · Angiosarcoma: No  · Blistering Sunburns: YES  · Eczema: No  · Psoriasis: No    Social History:    What is your current Smoking Status? 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 n/a    Family history:  Do any of your "first degree relatives" (parent, brother, sister, or child) have any of the following conditions? · Melanoma? (If Yes, which relatives?) No  · Eczema: No  · Asthma: No  · Hay Fever/Seasonal Allergies: No  · Psoriasis: No  · Arthritis: No  · Thyroid Problems: No  · Lupus/Connective Tissue Disease: No  · Diabetes: No  · Stroke: No  · Blood Clots: No  · IBD/Crohn's/Ulcerative Colitis: No  · Vitiligo: No  · Scarring/Keloids: No  · Severe Acne: No  · Pancreatic Cancer: No  · Other known Skin Condition? If Yes, what condition and which relatives?   No    Current Medications:    Current Outpatient Medications:     Ascorbic Acid (VITAMIN C PO), Take by mouth every other day, Disp: , Rfl:     ergocalciferol (VITAMIN D2) 50,000 units, Take by mouth, Disp: , Rfl:     ergocalciferol (VITAMIN D2) 50,000 units, , Disp: , Rfl:     ferrous sulfate 325 (65 Fe) mg tablet, Take 325 mg by mouth every other day, Disp: , Rfl:     Multiple Vitamin (MULTI-VITAMIN DAILY PO), Take 1 tablet by mouth daily, Disp: , Rfl:     Specific Alerts:    Are you pregnant or planning to become pregnant? N/A    Are you currently or planning to be nursing or breast feeding? N/A    No Known Allergies    May we call your Preferred Phone number to discuss your specific medical information? YES    May we leave a detailed message that includes your specific medical information? YES    Have you traveled outside of the [facility] in the past 3 months? No    Do you currently have a pacemaker or defibrillator? No    Do you have any artificial heart valves, joints, plates, screws, rods, stents, pins, etc? No   - If Yes, were any placed within the last 2 years? Do you require any medications prior to a surgical procedure? No   - If Yes, for which procedure? n/a   - If Yes, what medications to you require? n/a    Are you taking any medications that cause you to bleed more easily ("blood thinners") No    Have you ever experienced a rapid heartbeat with epinephrine? No    Have you ever been treated with "gold" (gold sodium thiomalate) therapy? No    [facility] can help with wrinkles, "laugh lines," facial volume loss, "double chin," "love handles," age spots, and more  Are you interested in learning today about some of the skin enhancement procedures that we offer? (If Yes, please provide more detail) No    Review of Systems:  Have you recently had or currently have any of the following?     · Fever or chills: No  · Night Sweats: No  · Headaches: No  · Weight Gain: {No  · Weight Loss: No  · Blurry Vision: No  · Nausea: No  · Vomiting: No  · Diarrhea: No  · Blood in Stool: No  · Abdominal Pain: No  · Itchy Skin: No  · Painful Joints: No  · Swollen Joints: No  · Muscle Pain: No  · Irregular Mole: No  · Sun Burn: No  · Dry Skin: No  · Skin Color Changes: No  · Scar or Keloid: No  · Cold Sores/Fever Blisters: No  · Bacterial Infections/MRSA: No  · Anxiety: No  · Depression: No  · Suicidal or Homicidal Thoughts: No      PHYSICAL EXAM:      Was a chaperone (Derm Clinical Assistant) present for the entirety of the Physical Exam? YES    Did the Dermatology Team specifically ask and  the patient on the importance of a Full Skin Exam to be sure that nothing is missed clinically?  YES    Did the patient request or accept a Full Skin Exam?  No    Did the patient specifically refuse to have the areas "under-the-bra" examined by the Dermatologist? No    Did the patient specifically refuse to have the areas "under-the-underwear" examined by the Dermatologist? No      CONSTITUTIONAL:   Vitals:    08/13/19 1446   Temp: 98 7 °F (37 1 °C)   TempSrc: Tympanic   Weight: 119 kg (263 lb)   Height: 5' 10 8" (1 798 m)       PSYCH: Normal mood and affect  EYES: Normal conjunctiva  ENT: Normal lips and oral mucosa  CARDIOVASCULAR: No edema  RESPIRATORY: Normal respirations  HEME/LYMPH/IMMUNO:  No regional lymphadenopathy except as noted below in [address] (SKIN)  Hair, Scalp, Ears, Face Normal except as noted below in Assessment   Neck, Cervical Chain Nodes Normal except as noted below in Assessment   Right Arm/Hand/Fingers Normal except as noted below in Assessment   Left Arm/Hand/Fingers Normal except as noted below in Assessment   Chest/Breasts/Axillae Viewed areas Normal except as noted below in Assessment   Abdomen, Umbilicus Normal except as noted below in Assessment   Back/Spine Normal except as noted below in Assessment   Groin/Genitalia/Buttocks Viewed areas Normal except as noted below in Assessment   Right Leg, Foot, Toes Normal except as noted below in Assessment   Left Leg, Foot, Toes Normal except as noted below in Assessment        ASSESSMENT AND PLAN BY DIAGNOSIS:    History of Present Condition:     Duration:  How long has this been an issue for you?    o Years   Location Affected:  Where on the body is this affecting you? o  Left forearm   Quality:  Is there any bleeding, pain, itch, burning/irritation, or redness associated with the skin lesion?    o  Deneis   Severity:  Describe any bleeding, pain, itch, burning/irritation, or redness on a scale of 1 to 10 (with 10 being the worst)  o  Denies   Timing:  Does this condition seem to be there pretty constantly or do you notice it more at specific times throughout the day?    o  Constantly   Context:  Have you ever noticed that this condition seems to be associated with specific activities you do?    o  Deneis   Modifying Factors:    o Anything that seems to make the condition worse?    -  Deneis  o What have you tried to do to make the condition better? -  Was excise    Associated Signs and Symptoms:  Does this skin lesion seem to be associated with any of the following:  o  DERM ASSOCIATED SIGNS AND SYMPTOMS: Redness     1  SUPERFICIAL BASAL CELL CARCINOMA    Physical Exam:   Anatomic Location Affected:  Left arm    Morphological Description: mildy indurated scar left without crusting or erosion   Pertinent Positives:   Pertinent Negatives:   Prior biopsy: Yes;  If YES, prior accession or case #: [id number]     Assessment and Plan:   I discussed that she has a potentially incompletely excised superficial basal cell  I reviewed opitons of MOHs  , further reexcision, or imiquimod  I noted that the later is approved as primary treatment of basal cell and can be uses as adjunctive therapy is this situation    She elects to use imiquimod 5 times a week for 6 weeks  Jaiden Rivera She was instructed to call at 3 weeks with her progress  What is basal cell carcinoma? Basal cell carcinoma (BCC) is a common, locally invasive, keratinocytic, or non-melanoma, skin cancer  It is also known as rodent ulcer and basalioma  Patients with BCC often develop multiple primary tumours over time      Who gets basal cell carcinoma? Risk factors for BCC include:  [facility] Age and sex: BCCs are particularly prevalent in elderly males  However, they also affect females and younger adults    Previous BCC or other form of skin cancer (squamous cell carcinoma, melanoma)    Sun damage (photoaging, actinic keratoses)    Repeated prior episodes of sunburn    Fair skin, blue eyes and blond or red hair--note; BCC can also affect darker skin types    Previous cutaneous injury, thermal burn, disease (eg cutaneous lupus, sebaceous naevus)    Inherited syndromes: BCC is a particular problem for families with basal cell naevus syndrome (Gorlin syndrome), Nmxps-Fwhné-Zxmngydb syndrome, Rombo syndrome, Oley syndrome and xeroderma pigmentosum    Other risk factors include ionising radiation, exposure to arsenic, and immune suppression due to disease or medicines    What causes basal cell carcinoma? The cause of BCC is multifactorial    Most often, there are DNA mutations in the patched [facility]) tumour suppressor gene, part of hedgehog signalling pathway    These may be triggered by exposure to ultraviolet radiation    Various spontaneous and inherited gene defects predispose to [facility]    What are the clinical features of basal cell carcinoma? BCC is a locally invasive skin tumour  The main characteristics are:   Slowly growing plaque or nodule    Skin coloured, pink or pigmented    Varies in size from a few millimetres to several centimetres in diameter    Spontaneous bleeding or ulceration  BCC is very rarely a threat to life  A tiny proportion of BCCs grow rapidly, invade deeply, and/or metastasise to local lymph nodes  Types of basal cell carcinoma  There are several distinct clinical types of BCC, and over 20 histological growth patterns of BCC    Nodular BCC   Most common type of facial BCC    Shiny or pearly nodule with a smooth surface    May have central depression or ulceration, so its edges appear rolled    Blood vessels cross its surface    Cystic variant is soft, with jelly-like contents    Micronodular, microcystic and infiltrative types are potentially aggressive subtypes    Also known as nodulocystic carcinoma  Superficial BCC   Most common type in younger adults    Most common type on upper trunk and shoulders    Slightly scaly, irregular plaque    Thin, translucent rolled border    Multiple microerosions  Morphoeaform BCC   Usually found in mid-facial sites    Waxy, scar-like plaque with indistinct borders    Wide and deep subclinical extension    May infiltrate cutaneous nerves (perineural spread)    Also known as morpheic, morphoeiform or sclerosing BCC  Basosquamous carcinoma   Mixed basal cell carcinoma (BCC) and squamous cell carcinoma (SCC)    Infiltrative growth pattern    Potentially more aggressive than other forms of BCC    Also known as basisquamous carcinoma and mixed basal-squamous cell carcinoma       Complications of basal cell carcinoma    Recurrent BCC  Recurrence of BCC after initial treatment is not uncommon  Characteristics of recurrent BCC often include:  o Incomplete excision or narrow margins at primary excision   o Morphoeic, micronodular, and infiltrative subtypes   o Location on head and neck    Advanced BCC  Advanced BCCs are large, often neglected tumours  o They may be several centimetres in diameter   o They may be deeply infiltrating into tissues below the skin   o They are difficult or impossible to treat surgically    Metastatic BCC  o Very rare   o Primary tumour is often large, neglected or recurrent, located on head and neck, with aggressive subtype   o May have had multiple prior treatments   o May arise in site exposed to ionising radiation   o Can be fatal    How is basal cell carcinoma diagnosed? BCC is diagnosed clinically by the presence of a slowly enlarging skin lesion with typical appearance  The diagnosis and  by a diagnostic biopsy or following excision    Some typical superficial BCCs on trunk and limbs are clinically diagnosed and have non-surgical treatment without histology  What is the treatment for primary basal cell carcinoma? The treatment for a [address] depends on its type, size and location, the number to be treated, patient factors, and the preference or expertise of the doctor  Most BCCs are treated surgically  Long-term follow-up is recommended to check for new lesions and recurrence; the latter may be unnecessary if histology has reported wide clear margins  Excision biopsy  Excision means the lesion is cut out and the skin stitched up   Most appropriate treatment for nodular, infiltrative and morphoeic BCCs    Should include 3 to 5 mm margin of normal skin around the tumour    Very large lesions may require flap or skin graft to repair the defect    Pathologist will report deep and lateral margins    Further surgery is recommended for lesions that are incompletely excised    Mohs micrographically controlled excision  Mohs micrographically controlled surgery involves examining carefully marked excised tissue under the microscope, layer by layer, to ensure complete excision   Very high cure rates achieved by trained Mohs surgeons    Used in high-risk areas of the face around eyes, lips and nose    Suitable for ill-defined, morphoeic, infiltrative and recurrent subtypes    Large defects are repaired by flap or skin graft    Superficial skin surgery  Superficial skin surgery comprises shave, curettage, and electrocautery  It is a rapid technique using local anaesthesia and does not require sutures     Suitable for small, well-defined nodular or superficial BCCs    Lesions are usually located on trunk or limbs    Wound is left open to heal by secondary intention    Moist wound dressings lead to healing within a few weeks    Eventual scar quality variable    Cryotherapy  Cryotherapy is the treatment of a superficial skin lesion by freezing it, usually with liquid nitrogen   Suitable for small superficial BCCs on covered areas of trunk and limbs    Best avoided for BCCs on head and neck, and distal to knees    Double freeze-thaw technique    Results in a blister that crusts over and heals within several weeks   Leaves permanent white josias    Photodynamic therapy  Photodynamic therapy (PDT) refers to a technique in which [address] is treated with a photosensitising chemical, and exposed to light several hours later   Topical photosensitisers include aminolevulinic acid lotion and methyl aminolevulinate cream    Suitable for low-risk small, superficial BCCs    Best avoided if tumour in site at high risk of recurrence    Results in inflammatory reaction, maximal 3-4 days after procedure    Treatment repeated 7 days after initial treatment    Excellent cosmetic results    Imiquimod cream  Imiquimod is an immune response modifier   Best used for superficial BCCs less than 2 cm diameter    Applied three to five times each week, for 6-16 weeks    Results in a variable inflammatory reaction, maximal at three weeks    Minimal scarring is usual    Fluorouracil cream  5-Fluorouracil cream is a topical cytotoxic agent   Used to treat small superficial basal cell carcinomas    Requires prolonged course, eg twice daily for 6-12 weeks    Causes inflammatory reaction    Has high recurrence rates    Radiotherapy  Radiotherapy or X-ray treatment can be used to treat primary BCCs or as adjunctive treatment if margins are incomplete     Mainly used if surgery is not suitable    Best avoided in young patients and in genetic conditions predisposing to skin cancer    Best cosmetic results achieved using multiple fractions    Typically, patient attends once-weekly for several weeks    Causes inflammatory reaction followed by scar    Risk of radiodermatitis, late recurrence, and new tumours    What is the treatment for advanced or metastatic basal cell carcinoma? Locally advanced primary, recurrent or metastatic BCC requires multidisciplinary consultation  Often a combination of treatments is used   Surgery    Radiotherapy    Targeted therapy  Targeted therapy refers to the hedgehog signalling pathway inhibitors, vismodegib and sonidegib  These drugs have some important risks and side effects  How can basal cell carcinoma be prevented? The most important way to prevent BCC is to avoid sunburn  This is especially important in childhood and early life  Fair skinned individuals and those with a personal or family history of BCC should protect their skin from sun exposure daily, year-round and lifelong   Stay indoors or under the shade in the middle of the day    Wear covering clothing    Apply high protection factor SPF50+ broad-spectrum sunscreens generously to exposed skin if outdoors    Avoid indoor tanning (sun beds, solaria)   Oral nicotinamide (vitamin B3) in a dose of 500 mg twice daily may reduce the number and severity of BCCs  What is the outlook for basal cell carcinoma? Most BCCs are cured by treatment  Cure is most likely if treatment is undertaken when the lesion is small  About 50% of people with BCC develop a second one within 3 years of the first  They are also at increased risk of other skin cancers, especially melanoma  Regular self-skin examinations and long-term annual skin checks by an experienced health professional are recommended  Treatment options for this lesion were reviewed and discussed     It was elected to proceed with Effudex    Scribe Attestation    I,:   Howard Crow MA am acting as a scribe while in the presence of the attending physician :        I,:   Charito Domínguez MD personally performed the services described in this documentation    as scribed in my presence :

## 2019-12-09 ENCOUNTER — OFFICE VISIT (OUTPATIENT)
Dept: INTERNAL MEDICINE CLINIC | Facility: CLINIC | Age: 49
End: 2019-12-09
Payer: COMMERCIAL

## 2019-12-09 ENCOUNTER — TELEPHONE (OUTPATIENT)
Dept: INTERNAL MEDICINE CLINIC | Facility: CLINIC | Age: 49
End: 2019-12-09

## 2019-12-09 DIAGNOSIS — J01.00 ACUTE NON-RECURRENT MAXILLARY SINUSITIS: Primary | ICD-10-CM

## 2019-12-09 PROCEDURE — 99213 OFFICE O/P EST LOW 20 MIN: CPT | Performed by: INTERNAL MEDICINE

## 2019-12-09 RX ORDER — AMOXICILLIN AND CLAVULANATE POTASSIUM 875; 125 MG/1; MG/1
1 TABLET, FILM COATED ORAL EVERY 12 HOURS SCHEDULED
Qty: 14 TABLET | Refills: 0 | Status: SHIPPED | OUTPATIENT
Start: 2019-12-09 | End: 2019-12-16

## 2019-12-09 NOTE — TELEPHONE ENCOUNTER
Patient called stating shes been having symptoms since Friday    - severe sinus pressure  - head pressure - worsens when she lays her head down  - vertigo ( on Friday she woke up tot he room spinning)  - worse at night  - no voice in the morning  - patients states she blows her nose in the morning and cant any other time    Has been using fluticasone  , which causes patient to have post nasal drip / sore throat  , pt has d/c since    Taking Asprin    - tried using mucinex but it made her vertigo worse        Patient is asking for an antibiotic , to be send to [facility]

## 2019-12-09 NOTE — PROGRESS NOTES
Assessment/Plan:    #Sinusitis  -started on Saturday with runny nose, occipital pressure and maxillary sinus pressure  -denies cough except in the AM, fevers, muscle aches  -states she feels like room is spinning on occasion  -will start on augmentin  -has been taking afrin and excedrin without relief  -recommended neti pot for relief    For comprehensive progress note refer to 7/10/19    No problem-specific Assessment & Plan notes found for this encounter  There are no diagnoses linked to this encounter  Current Outpatient Medications:     Ascorbic Acid (VITAMIN C PO), Take by mouth every other day, Disp: , Rfl:     ergocalciferol (VITAMIN D2) 50,000 units, Take by mouth, Disp: , Rfl:     ergocalciferol (VITAMIN D2) 50,000 units, , Disp: , Rfl:     ferrous sulfate 325 (65 Fe) mg tablet, Take 325 mg by mouth every other day, Disp: , Rfl:     fluorouracil (EFUDEX) 5 % cream, Apply topically once daily Monday to Friday for 6 weeks straight, Disp: 40 g, Rfl: 1    Multiple Vitamin (MULTI-VITAMIN DAILY PO), Take 1 tablet by mouth daily, Disp: , Rfl:     Subjective:      Patient ID: Ned Nevarez is a 54 y o  female  HPI     Patient presents as an acute visit  Reports that on Saturday morning she woke up with sensation of significant pressure and the back of her head as well as a temper area along with feelings of the room spinning and some vertigo  She reports that she had significant nasal congestion and would occasionally cough up sputum  She denies any fevers however states that whenever she woke up in the morning her voice would be raspy  She denies any changes in her appetite  She has tried taking over-the-counter nasal sprays however it caused sore throat has result she stopped this  She also took some Excedrin migraine relief however did provide her with any relief  Temperature today was 99° F and sinus tenderness was noted over the occipital area as well as the maxillary area    We will start her on Augmentin and encouraged her to start using a Neti pot to clear out her sinuses  She will call back within 2 weeks if symptoms persist or do not improve  The following portions of the patient's history were reviewed and updated as appropriate: allergies, current medications, past family history, past medical history, past social history, past surgical history and problem list     Review of Systems   Constitutional: Negative for activity change, appetite change, chills, fatigue, fever and unexpected weight change  HENT: Positive for congestion, sinus pressure and sinus pain  Negative for ear pain, facial swelling, hearing loss, sore throat, tinnitus, trouble swallowing and voice change  Eyes: Negative for photophobia and visual disturbance  Respiratory: Positive for cough  Negative for choking, chest tightness, shortness of breath and wheezing  Cardiovascular: Negative for chest pain, palpitations and leg swelling  Gastrointestinal: Negative for abdominal distention, abdominal pain, blood in stool, constipation, diarrhea, nausea and vomiting  Genitourinary: Negative for difficulty urinating, dysuria, flank pain and pelvic pain  Musculoskeletal: Negative for arthralgias, back pain, gait problem, joint swelling, myalgias, neck pain and neck stiffness  Skin: Negative for rash and wound  Neurological: Negative for dizziness, tremors, weakness, light-headedness, numbness and headaches  Objective: There were no vitals taken for this visit  Physical Exam   Constitutional: She is oriented to person, place, and time  She appears well-developed and well-nourished  HENT:   Head: Normocephalic and atraumatic  Right Ear: External ear normal    Left Ear: External ear normal    Nose: Nose normal    Mouth/Throat: Oropharynx is clear and moist    Left maxillary sinus tenderness and occipital tenderness   Eyes: Pupils are equal, round, and reactive to light  Conjunctivae and EOM are normal    Neck: Normal range of motion  Neck supple  No JVD present  No thyromegaly present  Cardiovascular: Normal rate, regular rhythm, normal heart sounds and intact distal pulses  No murmur heard  Pulmonary/Chest: Effort normal and breath sounds normal  No stridor  No respiratory distress  She has no wheezes  Abdominal: Soft  Bowel sounds are normal  She exhibits no distension and no mass  There is no tenderness  There is no rebound and no guarding  Musculoskeletal: Normal range of motion  She exhibits no edema or tenderness  Lymphadenopathy:     She has no cervical adenopathy  Neurological: She is alert and oriented to person, place, and time  She has normal reflexes  Skin: Skin is warm and dry  No rash noted  No erythema  Vitals reviewed

## 2020-07-01 ENCOUNTER — APPOINTMENT (OUTPATIENT)
Dept: LAB | Facility: CLINIC | Age: 50
End: 2020-07-01
Payer: COMMERCIAL

## 2020-07-01 ENCOUNTER — TRANSCRIBE ORDERS (OUTPATIENT)
Dept: LAB | Facility: CLINIC | Age: 50
End: 2020-07-01

## 2020-07-01 DIAGNOSIS — D72.829 LEUKOCYTOSIS, UNSPECIFIED TYPE: ICD-10-CM

## 2020-07-01 DIAGNOSIS — E61.1 IRON DEFICIENCY: Chronic | ICD-10-CM

## 2020-07-01 DIAGNOSIS — E78.5 HYPERLIPIDEMIA, UNSPECIFIED HYPERLIPIDEMIA TYPE: Chronic | ICD-10-CM

## 2020-07-01 DIAGNOSIS — R73.03 PRE-DIABETES: Chronic | ICD-10-CM

## 2020-07-01 DIAGNOSIS — Z98.84 STATUS POST GASTRIC BYPASS FOR OBESITY: Chronic | ICD-10-CM

## 2020-07-01 LAB
25(OH)D3 SERPL-MCNC: 32.2 NG/ML (ref 30–100)
ALBUMIN SERPL BCP-MCNC: 3.5 G/DL (ref 3.5–5)
ALP SERPL-CCNC: 96 U/L (ref 46–116)
ALT SERPL W P-5'-P-CCNC: 16 U/L (ref 12–78)
ANION GAP SERPL CALCULATED.3IONS-SCNC: 8 MMOL/L (ref 4–13)
AST SERPL W P-5'-P-CCNC: 12 U/L (ref 5–45)
BASOPHILS # BLD AUTO: 0.04 THOUSANDS/ΜL (ref 0–0.1)
BASOPHILS NFR BLD AUTO: 1 % (ref 0–1)
BILIRUB SERPL-MCNC: 0.68 MG/DL (ref 0.2–1)
BUN SERPL-MCNC: 12 MG/DL (ref 5–25)
CALCIUM SERPL-MCNC: 8.6 MG/DL (ref 8.3–10.1)
CHLORIDE SERPL-SCNC: 104 MMOL/L (ref 100–108)
CHOLEST SERPL-MCNC: 162 MG/DL (ref 50–200)
CO2 SERPL-SCNC: 26 MMOL/L (ref 21–32)
CREAT SERPL-MCNC: 0.67 MG/DL (ref 0.6–1.3)
EOSINOPHIL # BLD AUTO: 0.17 THOUSAND/ΜL (ref 0–0.61)
EOSINOPHIL NFR BLD AUTO: 2 % (ref 0–6)
ERYTHROCYTE [DISTWIDTH] IN BLOOD BY AUTOMATED COUNT: 13.3 % (ref 11.6–15.1)
GFR SERPL CREATININE-BSD FRML MDRD: 105 ML/MIN/1.73SQ M
GLUCOSE P FAST SERPL-MCNC: 105 MG/DL (ref 65–99)
HCT VFR BLD AUTO: 42.4 % (ref 34.8–46.1)
HDLC SERPL-MCNC: 44 MG/DL
HGB BLD-MCNC: 13.4 G/DL (ref 11.5–15.4)
IMM GRANULOCYTES # BLD AUTO: 0.03 THOUSAND/UL (ref 0–0.2)
IMM GRANULOCYTES NFR BLD AUTO: 0 % (ref 0–2)
LDLC SERPL DIRECT ASSAY-MCNC: 111 MG/DL (ref 0–100)
LYMPHOCYTES # BLD AUTO: 2.3 THOUSANDS/ΜL (ref 0.6–4.47)
LYMPHOCYTES NFR BLD AUTO: 28 % (ref 14–44)
MCH RBC QN AUTO: 28.9 PG (ref 26.8–34.3)
MCHC RBC AUTO-ENTMCNC: 31.6 G/DL (ref 31.4–37.4)
MCV RBC AUTO: 91 FL (ref 82–98)
MONOCYTES # BLD AUTO: 0.64 THOUSAND/ΜL (ref 0.17–1.22)
MONOCYTES NFR BLD AUTO: 8 % (ref 4–12)
NEUTROPHILS # BLD AUTO: 5.1 THOUSANDS/ΜL (ref 1.85–7.62)
NEUTS SEG NFR BLD AUTO: 61 % (ref 43–75)
NRBC BLD AUTO-RTO: 0 /100 WBCS
PLATELET # BLD AUTO: 233 THOUSANDS/UL (ref 149–390)
PMV BLD AUTO: 12.5 FL (ref 8.9–12.7)
POTASSIUM SERPL-SCNC: 4.1 MMOL/L (ref 3.5–5.3)
PROT SERPL-MCNC: 6.9 G/DL (ref 6.4–8.2)
RBC # BLD AUTO: 4.64 MILLION/UL (ref 3.81–5.12)
SODIUM SERPL-SCNC: 138 MMOL/L (ref 136–145)
TRIGL SERPL-MCNC: 77 MG/DL
TSH SERPL DL<=0.05 MIU/L-ACNC: 3.12 UIU/ML (ref 0.36–3.74)
VIT B12 SERPL-MCNC: 180 PG/ML (ref 100–900)
WBC # BLD AUTO: 8.28 THOUSAND/UL (ref 4.31–10.16)

## 2020-07-01 PROCEDURE — 82306 VITAMIN D 25 HYDROXY: CPT

## 2020-07-01 PROCEDURE — 85025 COMPLETE CBC W/AUTO DIFF WBC: CPT

## 2020-07-01 PROCEDURE — 80061 LIPID PANEL: CPT

## 2020-07-01 PROCEDURE — 82525 ASSAY OF COPPER: CPT

## 2020-07-01 PROCEDURE — 36415 COLL VENOUS BLD VENIPUNCTURE: CPT

## 2020-07-01 PROCEDURE — 84630 ASSAY OF ZINC: CPT

## 2020-07-01 PROCEDURE — 82607 VITAMIN B-12: CPT

## 2020-07-01 PROCEDURE — 83721 ASSAY OF BLOOD LIPOPROTEIN: CPT

## 2020-07-01 PROCEDURE — 84443 ASSAY THYROID STIM HORMONE: CPT

## 2020-07-01 PROCEDURE — 80053 COMPREHEN METABOLIC PANEL: CPT

## 2020-07-05 LAB
COPPER SERPL-MCNC: 122 UG/DL (ref 72–166)
ZINC SERPL-MCNC: 78 UG/DL (ref 56–134)

## 2020-07-09 ENCOUNTER — OFFICE VISIT (OUTPATIENT)
Dept: INTERNAL MEDICINE CLINIC | Facility: CLINIC | Age: 50
End: 2020-07-09
Payer: COMMERCIAL

## 2020-07-09 VITALS
OXYGEN SATURATION: 99 % | RESPIRATION RATE: 18 BRPM | SYSTOLIC BLOOD PRESSURE: 122 MMHG | WEIGHT: 275.4 LBS | HEART RATE: 66 BPM | BODY MASS INDEX: 39.43 KG/M2 | DIASTOLIC BLOOD PRESSURE: 78 MMHG | HEIGHT: 70 IN | TEMPERATURE: 98 F

## 2020-07-09 DIAGNOSIS — E61.1 IRON DEFICIENCY: ICD-10-CM

## 2020-07-09 DIAGNOSIS — E78.5 HYPERLIPIDEMIA, UNSPECIFIED HYPERLIPIDEMIA TYPE: ICD-10-CM

## 2020-07-09 DIAGNOSIS — Z12.39 SCREENING FOR BREAST CANCER: ICD-10-CM

## 2020-07-09 DIAGNOSIS — E55.9 VITAMIN D DEFICIENCY: ICD-10-CM

## 2020-07-09 DIAGNOSIS — Z13.1 SCREENING FOR DIABETES MELLITUS: ICD-10-CM

## 2020-07-09 DIAGNOSIS — Z98.84 STATUS POST GASTRIC BYPASS FOR OBESITY: Primary | ICD-10-CM

## 2020-07-09 PROCEDURE — 99396 PREV VISIT EST AGE 40-64: CPT | Performed by: INTERNAL MEDICINE

## 2020-07-09 RX ORDER — MULTIVITAMIN
1 TABLET ORAL DAILY
Qty: 90 TABLET | Refills: 3 | Status: SHIPPED | OUTPATIENT
Start: 2020-07-09 | End: 2021-07-09

## 2020-07-09 RX ORDER — FERROUS SULFATE 325(65) MG
325 TABLET ORAL EVERY OTHER DAY
Qty: 90 TABLET | Refills: 1 | Status: SHIPPED | OUTPATIENT
Start: 2020-07-09 | End: 2021-07-09 | Stop reason: SDUPTHER

## 2020-07-09 RX ORDER — ASCORBIC ACID 500 MG
500 TABLET ORAL EVERY OTHER DAY
Qty: 90 TABLET | Refills: 1 | Status: SHIPPED | OUTPATIENT
Start: 2020-07-09 | End: 2021-07-09 | Stop reason: SDUPTHER

## 2020-07-09 RX ORDER — ERGOCALCIFEROL 1.25 MG/1
50000 CAPSULE ORAL
Qty: 12 CAPSULE | Refills: 0 | Status: SHIPPED | OUTPATIENT
Start: 2020-07-09 | End: 2021-07-09 | Stop reason: SDUPTHER

## 2020-07-09 NOTE — PROGRESS NOTES
Assessment/Plan:    #Basal Cell CA  -noted on left forearm and was removed  -sees dermatology    #Divericulosis  -previous flareup requiring hospitalization and antibiotic  -colonoscopy showed diverticulosis  -knows to remain on fiber and drink water    #Hearing Loss  -noted on left ear  -defers hearing aides for now    #Lumbar Vertebral Body  -MRI showed degenerative changes and foraminal stenosis  -remains asymptomatic    #Gastric Bypass  -continue to monitor for zinc, copper, folate, vitamin D  -remains on multivitamin, vit D, Vit C  -encouraged her to increase complaince on multivitamin since B12 was low at 180    #HLD  - and HDL 44  -continue to monitor    #Fatty Liver  -previously noted  -encouraged weight loss    #Iron Deficiency  -CBC stable  -continue iron supplement    #Prediabetes  -fasting glucose stable    #HTN  -BP stable off medication    #Health Maintenance  -routine labs and return to care 1 year  -mammogram referral provided  -colonoscopy due 2025    BMI Counseling: Body mass index is 39 52 kg/m²  Discussed the patient's BMI with her  The BMI is above normal  Nutrition recommendations include decreasing overall calorie intake, 3-5 servings of fruits/vegetables daily and reducing fast food intake  Exercise recommendations include moderate aerobic physical activity for 150 minutes/week, vigorous aerobic physical activity for 75 minutes/week and exercising 3-5 times per week  Addendum 4/13/21 patient seen by general surgery for episodic bloody drainage from the umbilicus  No opening or pore noted  Symptoms likely a stitch abscess  No problem-specific Assessment & Plan notes found for this encounter  Diagnoses and all orders for this visit:    Status post gastric bypass for obesity  -     ascorbic acid (VITAMIN C) 500 mg tablet; Take 1 tablet (500 mg total) by mouth every other day  -     ergocalciferol (VITAMIN D2) 50,000 units;  Take 1 capsule (50,000 Units total) by mouth every 28 days  -     ferrous sulfate 325 (65 Fe) mg tablet; Take 1 tablet (325 mg total) by mouth every other day  -     Multiple Vitamin (MULTI-VITAMIN DAILY) TABS; Take 1 tablet by mouth daily  -     Vitamin B12; Future  -     Vitamin D 25 hydroxy; Future  -     Comprehensive metabolic panel; Future  -     Folate; Future  -     Zinc; Future  -     Copper Level; Future    Iron deficiency  -     ferrous sulfate 325 (65 Fe) mg tablet; Take 1 tablet (325 mg total) by mouth every other day  -     CBC and differential; Future    Vitamin D deficiency  -     ergocalciferol (VITAMIN D2) 50,000 units; Take 1 capsule (50,000 Units total) by mouth every 28 days  -     Multiple Vitamin (MULTI-VITAMIN DAILY) TABS; Take 1 tablet by mouth daily    Screening for breast cancer  -     Mammo screening bilateral w 3d & cad; Future    Hyperlipidemia, unspecified hyperlipidemia type  -     Lipid Panel With Direct LDL; Future    Screening for diabetes mellitus  -     Hemoglobin A1C; Future    Other orders  -     Cancel: HIV 1/2 Antigen/Antibody (4th Generation) w Reflex [facility]; Future            Current Outpatient Medications:     ergocalciferol (VITAMIN D2) 50,000 units, Take 1 capsule (50,000 Units total) by mouth every 28 days, Disp: 12 capsule, Rfl: 0    ferrous sulfate 325 (65 Fe) mg tablet, Take 1 tablet (325 mg total) by mouth every other day, Disp: 90 tablet, Rfl: 1    ascorbic acid (VITAMIN C) 500 mg tablet, Take 1 tablet (500 mg total) by mouth every other day, Disp: 90 tablet, Rfl: 1    Multiple Vitamin (MULTI-VITAMIN DAILY) TABS, Take 1 tablet by mouth daily, Disp: 90 tablet, Rfl: 3    Subjective:      Patient ID: Ludivina Hunter is a 54 y o  female  HPI     Patient presents for routine visit  Denies any recent hospitalizations or surgeries  She does have history of a gastric bypass surgery however her vitamin levels remained stable  Her copper was 122 and using 78 with vitamin-D 32 2  Her vitamin B12 was low at 180  Recommended that she remain compliant on her multivitamin  She states that she has not been taking this daily as she should be  She remains on monthly vitamin-D as well as calcium pills and iron pills every other day  Her CBC was unremarkable  Her LDL was 111 and HDL 44 we encouraged her to diet and exercise  She states that she has not been eating healthy however will start appliance next week when she is no longer on vacation  She continues to report difficulty hearing in large crowds  She states that she is deferring ENT evaluation at this time  Patient is due for routine mammogram screening and we gave her the referral for that  She will follow-up in 1 year with labs  The following portions of the patient's history were reviewed and updated as appropriate: allergies, current medications, past family history, past medical history, past social history, past surgical history and problem list     Review of Systems   Constitutional: Positive for unexpected weight change (weight gain)  Negative for activity change, appetite change, chills, fatigue and fever  HENT: Positive for hearing loss  Negative for congestion, ear pain, facial swelling, sore throat, tinnitus and trouble swallowing  Eyes: Negative for photophobia and visual disturbance  Respiratory: Negative for cough, shortness of breath and wheezing  Cardiovascular: Negative for chest pain and leg swelling  Gastrointestinal: Negative for abdominal distention, abdominal pain, blood in stool, constipation, diarrhea, nausea and vomiting  Genitourinary: Negative for difficulty urinating, dysuria and pelvic pain  Musculoskeletal: Negative for arthralgias, back pain, gait problem, joint swelling, myalgias, neck pain and neck stiffness  Skin: Negative for rash and wound  Neurological: Negative for dizziness, tremors, light-headedness and headaches           Objective:      /78   Pulse 66   Temp 98 °F (36 7 °C)   Resp 18   Ht 5' 10" (1 778 m)   Wt 125 kg (275 lb 6 4 oz)   SpO2 99%   BMI 39 52 kg/m²          Physical Exam   Constitutional: She is oriented to person, place, and time  She appears well-developed and well-nourished  obesity   HENT:   Head: Normocephalic and atraumatic  Right Ear: External ear normal    Left Ear: External ear normal    Eyes: Pupils are equal, round, and reactive to light  Conjunctivae and EOM are normal    Neck: Normal range of motion  Neck supple  No JVD present  No thyromegaly present  Cardiovascular: Normal rate, regular rhythm, normal heart sounds and intact distal pulses  No murmur heard  Pulmonary/Chest: Effort normal and breath sounds normal  No stridor  No respiratory distress  She has no wheezes  Abdominal: Soft  Bowel sounds are normal  She exhibits no distension  There is no tenderness  There is no rebound and no guarding  Musculoskeletal: Normal range of motion  She exhibits no edema or tenderness  Lymphadenopathy:     She has no cervical adenopathy  Neurological: She is alert and oriented to person, place, and time  She has normal reflexes  Skin: Skin is warm  No rash noted  No erythema  Vitals reviewed

## 2020-09-15 ENCOUNTER — HOSPITAL ENCOUNTER (OUTPATIENT)
Dept: RADIOLOGY | Facility: HOSPITAL | Age: 50
Discharge: HOME | End: 2020-09-15
Payer: COMMERCIAL

## 2020-09-15 ENCOUNTER — OFFICE VISIT (OUTPATIENT)
Dept: INTERNAL MEDICINE CLINIC | Facility: CLINIC | Age: 50
End: 2020-09-15
Payer: COMMERCIAL

## 2020-09-15 ENCOUNTER — TELEPHONE (OUTPATIENT)
Dept: INTERNAL MEDICINE CLINIC | Facility: CLINIC | Age: 50
End: 2020-09-15

## 2020-09-15 ENCOUNTER — APPOINTMENT (OUTPATIENT)
Dept: LAB | Facility: HOSPITAL | Age: 50
End: 2020-09-15
Payer: COMMERCIAL

## 2020-09-15 VITALS
SYSTOLIC BLOOD PRESSURE: 128 MMHG | OXYGEN SATURATION: 98 % | BODY MASS INDEX: 37.65 KG/M2 | DIASTOLIC BLOOD PRESSURE: 70 MMHG | HEIGHT: 70 IN | WEIGHT: 263 LBS | HEART RATE: 77 BPM

## 2020-09-15 DIAGNOSIS — R11.2 NAUSEA AND VOMITING, INTRACTABILITY OF VOMITING NOT SPECIFIED, UNSPECIFIED VOMITING TYPE: ICD-10-CM

## 2020-09-15 DIAGNOSIS — R10.13 EPIGASTRIC ABDOMINAL PAIN: ICD-10-CM

## 2020-09-15 DIAGNOSIS — Z98.84 STATUS POST GASTRIC BYPASS FOR OBESITY: ICD-10-CM

## 2020-09-15 DIAGNOSIS — K21.9 GASTROESOPHAGEAL REFLUX DISEASE, ESOPHAGITIS PRESENCE NOT SPECIFIED: ICD-10-CM

## 2020-09-15 DIAGNOSIS — R10.13 EPIGASTRIC ABDOMINAL PAIN: Primary | ICD-10-CM

## 2020-09-15 LAB
ALBUMIN SERPL BCP-MCNC: 3.6 G/DL (ref 3.5–5)
ALP SERPL-CCNC: 71 U/L (ref 46–116)
ALT SERPL W P-5'-P-CCNC: 30 U/L (ref 12–78)
ANION GAP SERPL CALCULATED.3IONS-SCNC: 8 MMOL/L (ref 4–13)
AST SERPL W P-5'-P-CCNC: 31 U/L (ref 5–45)
BASOPHILS # BLD AUTO: 0.04 THOUSANDS/ΜL (ref 0–0.1)
BASOPHILS NFR BLD AUTO: 0 % (ref 0–1)
BILIRUB DIRECT SERPL-MCNC: 0.19 MG/DL (ref 0–0.2)
BILIRUB SERPL-MCNC: 0.61 MG/DL (ref 0.2–1)
BUN SERPL-MCNC: 9 MG/DL (ref 5–25)
CALCIUM SERPL-MCNC: 9.1 MG/DL (ref 8.3–10.1)
CHLORIDE SERPL-SCNC: 106 MMOL/L (ref 100–108)
CO2 SERPL-SCNC: 24 MMOL/L (ref 21–32)
CREAT SERPL-MCNC: 0.52 MG/DL (ref 0.6–1.3)
EOSINOPHIL # BLD AUTO: 0.05 THOUSAND/ΜL (ref 0–0.61)
EOSINOPHIL NFR BLD AUTO: 1 % (ref 0–6)
ERYTHROCYTE [DISTWIDTH] IN BLOOD BY AUTOMATED COUNT: 13.3 % (ref 11.6–15.1)
GFR SERPL CREATININE-BSD FRML MDRD: 113 ML/MIN/1.73SQ M
GLUCOSE P FAST SERPL-MCNC: 98 MG/DL (ref 65–99)
HCT VFR BLD AUTO: 39.7 % (ref 34.8–46.1)
HGB BLD-MCNC: 13 G/DL (ref 11.5–15.4)
IMM GRANULOCYTES # BLD AUTO: 0.04 THOUSAND/UL (ref 0–0.2)
IMM GRANULOCYTES NFR BLD AUTO: 0 % (ref 0–2)
LIPASE SERPL-CCNC: 104 U/L (ref 73–393)
LYMPHOCYTES # BLD AUTO: 1.99 THOUSANDS/ΜL (ref 0.6–4.47)
LYMPHOCYTES NFR BLD AUTO: 22 % (ref 14–44)
MCH RBC QN AUTO: 29.8 PG (ref 26.8–34.3)
MCHC RBC AUTO-ENTMCNC: 32.7 G/DL (ref 31.4–37.4)
MCV RBC AUTO: 91 FL (ref 82–98)
MONOCYTES # BLD AUTO: 0.61 THOUSAND/ΜL (ref 0.17–1.22)
MONOCYTES NFR BLD AUTO: 7 % (ref 4–12)
NEUTROPHILS # BLD AUTO: 6.45 THOUSANDS/ΜL (ref 1.85–7.62)
NEUTS SEG NFR BLD AUTO: 70 % (ref 43–75)
NRBC BLD AUTO-RTO: 0 /100 WBCS
PLATELET # BLD AUTO: 209 THOUSANDS/UL (ref 149–390)
PMV BLD AUTO: 12.6 FL (ref 8.9–12.7)
POTASSIUM SERPL-SCNC: 3.8 MMOL/L (ref 3.5–5.3)
PROT SERPL-MCNC: 6.9 G/DL (ref 6.4–8.2)
RBC # BLD AUTO: 4.36 MILLION/UL (ref 3.81–5.12)
SODIUM SERPL-SCNC: 138 MMOL/L (ref 136–145)
WBC # BLD AUTO: 9.18 THOUSAND/UL (ref 4.31–10.16)

## 2020-09-15 PROCEDURE — 36415 COLL VENOUS BLD VENIPUNCTURE: CPT | Performed by: INTERNAL MEDICINE

## 2020-09-15 PROCEDURE — 85025 COMPLETE CBC W/AUTO DIFF WBC: CPT | Performed by: INTERNAL MEDICINE

## 2020-09-15 PROCEDURE — G1004 CDSM NDSC: HCPCS

## 2020-09-15 PROCEDURE — 99214 OFFICE O/P EST MOD 30 MIN: CPT | Performed by: INTERNAL MEDICINE

## 2020-09-15 PROCEDURE — 83690 ASSAY OF LIPASE: CPT | Performed by: INTERNAL MEDICINE

## 2020-09-15 PROCEDURE — 80048 BASIC METABOLIC PNL TOTAL CA: CPT | Performed by: INTERNAL MEDICINE

## 2020-09-15 PROCEDURE — 80076 HEPATIC FUNCTION PANEL: CPT | Performed by: INTERNAL MEDICINE

## 2020-09-15 PROCEDURE — 74177 CT ABD & PELVIS W/CONTRAST: CPT

## 2020-09-15 RX ORDER — OMEPRAZOLE 40 MG/1
40 CAPSULE, DELAYED RELEASE ORAL
Qty: 30 CAPSULE | Refills: 0 | Status: SHIPPED | OUTPATIENT
Start: 2020-09-15 | End: 2021-07-09 | Stop reason: ALTCHOICE

## 2020-09-15 RX ORDER — ONDANSETRON 4 MG/1
4 TABLET, FILM COATED ORAL EVERY 8 HOURS PRN
Qty: 20 TABLET | Refills: 0 | Status: SHIPPED | OUTPATIENT
Start: 2020-09-15 | End: 2021-07-09 | Stop reason: ALTCHOICE

## 2020-09-15 RX ADMIN — IOHEXOL 100 ML: 350 INJECTION, SOLUTION INTRAVENOUS at 14:27

## 2020-09-15 NOTE — LETTER
September 15, 2020     Patient: Valencia Shen   YOB: 1970   Date of Visit: 9/15/2020       To Whom it May Concern:    Valencia Shen is under my professional care  She was seen in my office on 9/15/2020  She may return to work 9/21/20  If you have any questions or concerns, please don't hesitate to call           Sincerely,          Minnie Vazquez DO        CC: No Recipients

## 2020-09-15 NOTE — PROGRESS NOTES
Assessment/Plan:    #Epigastric Abdominal Pain  -pain since Sunday and not improved  -has chills with nausea and vomiting  -unable to keep anything down including liquids  -has been taking tylenol without relief  -will obtain lipase, CBC, BMP, hepatic function panel  -obtain stat CT A/P with contrast oral and IV    #Previous Gastric Bypass Surgery  -previous surgery and concern for possible bowel obstruction with diminished bowel sounds  -obtain CT A/P    #Gerd  -will start on prilosec    #Nausea/vomiting  -will start on zofran    Addendum 9/15/20 CT A/P unremarkable, labs stable  No problem-specific Assessment & Plan notes found for this encounter  Diagnoses and all orders for this visit:    Epigastric abdominal pain  -     omeprazole (PriLOSEC) 40 MG capsule; Take 1 capsule (40 mg total) by mouth daily before breakfast  -     ondansetron (ZOFRAN) 4 mg tablet; Take 1 tablet (4 mg total) by mouth every 8 (eight) hours as needed for nausea or vomiting  -     CT abdomen pelvis w contrast; Future  -     CBC and differential  -     Basic metabolic panel  -     Lipase  -     Hepatic function panel    Gastroesophageal reflux disease, esophagitis presence not specified  -     omeprazole (PriLOSEC) 40 MG capsule; Take 1 capsule (40 mg total) by mouth daily before breakfast    Nausea and vomiting, intractability of vomiting not specified, unspecified vomiting type  -     omeprazole (PriLOSEC) 40 MG capsule; Take 1 capsule (40 mg total) by mouth daily before breakfast  -     ondansetron (ZOFRAN) 4 mg tablet;  Take 1 tablet (4 mg total) by mouth every 8 (eight) hours as needed for nausea or vomiting  -     CT abdomen pelvis w contrast; Future    Status post gastric bypass for obesity  -     CT abdomen pelvis w contrast; Future  -     CBC and differential  -     Basic metabolic panel  -     Lipase  -     Hepatic function panel            Current Outpatient Medications:     ascorbic acid (VITAMIN C) 500 mg tablet, Take 1 tablet (500 mg total) by mouth every other day, Disp: 90 tablet, Rfl: 1    ergocalciferol (VITAMIN D2) 50,000 units, Take 1 capsule (50,000 Units total) by mouth every 28 days, Disp: 12 capsule, Rfl: 0    ferrous sulfate 325 (65 Fe) mg tablet, Take 1 tablet (325 mg total) by mouth every other day, Disp: 90 tablet, Rfl: 1    Multiple Vitamin (MULTI-VITAMIN DAILY) TABS, Take 1 tablet by mouth daily, Disp: 90 tablet, Rfl: 3    omeprazole (PriLOSEC) 40 MG capsule, Take 1 capsule (40 mg total) by mouth daily before breakfast, Disp: 30 capsule, Rfl: 0    ondansetron (ZOFRAN) 4 mg tablet, Take 1 tablet (4 mg total) by mouth every 8 (eight) hours as needed for nausea or vomiting, Disp: 20 tablet, Rfl: 0    Subjective:      Patient ID: Nori Whitlock is a 52 y o  female  HPI     Patient presents for an acute visit  Reports that she has been under lot of stress over the past couple days where she had a death in the family and is now starting to move  She states that over the past week she has been packing up things and moving boxes significantly  She woke up on Sunday morning with epigastric abdominal pain and significant nausea and acid reflux  She states that she has been bringing up dry heaves as well as some minor acid in her vomitus  She states that she has been unable to keep any fluids or drink anything without any discomfort  She states that she tried to eat some dry cereal however ended up vomiting as well  She states that she has not had any diarrhea  She denies any fevers  She states that she does have chills  She states that she does feel thirsty and hungry however is unable to keep anything down  At this time we will start her on Zofran as well as Prilosec  We will obtain CT abdomen and pelvis  She will also obtain a lipase, see it CBC, hepatic function panel, and BMP  She has had previous gastric bypass surgery and we are concerned for possible bowel obstruction    Encouraged patient to go to the nearest emergency room if she continues to have symptoms and is unable to keep anything in her stomach  Patient has been taking Tylenol without any relief and we encouraged her to discontinue this for now  The following portions of the patient's history were reviewed and updated as appropriate: allergies, current medications, past family history, past medical history, past social history, past surgical history and problem list     Review of Systems   Constitutional: Positive for chills and fatigue  Negative for activity change, appetite change and fever  HENT: Negative for congestion, ear pain, facial swelling, hearing loss, sore throat, tinnitus and trouble swallowing  Eyes: Negative for photophobia and visual disturbance  Respiratory: Negative for cough, shortness of breath and wheezing  Cardiovascular: Negative for chest pain and leg swelling  Gastrointestinal: Positive for abdominal pain, nausea and vomiting  Negative for abdominal distention, blood in stool, constipation and diarrhea  Genitourinary: Negative for difficulty urinating, dysuria and pelvic pain  Musculoskeletal: Negative for arthralgias, back pain, gait problem, joint swelling, myalgias, neck pain and neck stiffness  Skin: Negative for rash and wound  Neurological: Negative for dizziness, tremors, light-headedness and headaches  Objective:      /70   Pulse 77   Ht 5' 10" (1 778 m)   Wt 119 kg (263 lb)   SpO2 98%   BMI 37 74 kg/m²          Physical Exam  Vitals signs reviewed  Constitutional:       Appearance: She is well-developed  She is obese  She is ill-appearing  HENT:      Head: Normocephalic and atraumatic  Right Ear: External ear normal       Left Ear: External ear normal       Nose: Nose normal    Eyes:      Conjunctiva/sclera: Conjunctivae normal       Pupils: Pupils are equal, round, and reactive to light  Neck:      Musculoskeletal: Normal range of motion and neck supple  Thyroid: No thyromegaly  Vascular: No JVD  Cardiovascular:      Rate and Rhythm: Normal rate and regular rhythm  Heart sounds: Normal heart sounds  No murmur  Pulmonary:      Effort: Pulmonary effort is normal  No respiratory distress  Breath sounds: Normal breath sounds  No stridor  No wheezing  Abdominal:      General: Abdomen is flat  Bowel sounds are decreased  There is no distension or abdominal bruit  There are no signs of injury  Palpations: Abdomen is soft  There is no shifting dullness, fluid wave, hepatomegaly, splenomegaly, mass or pulsatile mass  Tenderness: There is abdominal tenderness in the epigastric area  There is no right CVA tenderness, left CVA tenderness, guarding or rebound  Negative signs include Rodriguez's sign, Rovsing's sign, McBurney's sign, psoas sign and obturator sign  Hernia: No hernia is present  There is no hernia in the umbilical area or ventral area  Musculoskeletal: Normal range of motion  General: No tenderness  Lymphadenopathy:      Cervical: No cervical adenopathy  Skin:     General: Skin is warm and dry  Findings: No erythema or rash  Neurological:      Mental Status: She is alert and oriented to person, place, and time  Deep Tendon Reflexes: Reflexes are normal and symmetric

## 2020-09-15 NOTE — TELEPHONE ENCOUNTER
Pt was moving on Friday so she was lifting heavy things that day but it wasn't until Sunday that she started feeling abdominal pain, nausea, vomiting, and chills  Pt has no fever but she feels dehydrated because she can't keep food or drink down  Pt asked if you wanted to see her or if she should see gastro  Please advise       ZACHERY#: [id number]

## 2020-10-28 ENCOUNTER — CLINICAL SUPPORT (OUTPATIENT)
Dept: INTERNAL MEDICINE CLINIC | Facility: CLINIC | Age: 50
End: 2020-10-28
Payer: COMMERCIAL

## 2020-10-28 DIAGNOSIS — Z23 NEEDS FLU SHOT: Primary | ICD-10-CM

## 2020-10-28 PROCEDURE — 90471 IMMUNIZATION ADMIN: CPT

## 2020-10-28 PROCEDURE — 90682 RIV4 VACC RECOMBINANT DNA IM: CPT

## 2020-12-28 ENCOUNTER — IMMUNIZATIONS (OUTPATIENT)
Dept: FAMILY MEDICINE CLINIC | Facility: HOSPITAL | Age: 50
End: 2020-12-28
Payer: COMMERCIAL

## 2020-12-28 DIAGNOSIS — Z23 ENCOUNTER FOR IMMUNIZATION: ICD-10-CM

## 2020-12-28 PROCEDURE — 91301 SARS-COV-2 / COVID-19 MRNA VACCINE (MODERNA) 100 MCG: CPT

## 2020-12-28 PROCEDURE — 0011A SARS-COV-2 / COVID-19 MRNA VACCINE (MODERNA) 100 MCG: CPT

## 2021-01-23 ENCOUNTER — IMMUNIZATIONS (OUTPATIENT)
Dept: FAMILY MEDICINE CLINIC | Facility: HOSPITAL | Age: 51
End: 2021-01-23

## 2021-01-23 DIAGNOSIS — Z23 ENCOUNTER FOR IMMUNIZATION: Primary | ICD-10-CM

## 2021-01-23 PROCEDURE — 0012A SARS-COV-2 / COVID-19 MRNA VACCINE (MODERNA) 100 MCG: CPT

## 2021-01-23 PROCEDURE — 91301 SARS-COV-2 / COVID-19 MRNA VACCINE (MODERNA) 100 MCG: CPT

## 2021-04-12 ENCOUNTER — OFFICE VISIT (OUTPATIENT)
Dept: SURGERY | Facility: CLINIC | Age: 51
End: 2021-04-12
Payer: COMMERCIAL

## 2021-04-12 VITALS — BODY MASS INDEX: 37.51 KG/M2 | WEIGHT: 262 LBS | HEIGHT: 70 IN

## 2021-04-12 DIAGNOSIS — K42.9 UMBILICAL HERNIA: Primary | ICD-10-CM

## 2021-04-12 DIAGNOSIS — K80.50 BILIARY COLIC: ICD-10-CM

## 2021-04-12 DIAGNOSIS — R10.9 ABDOMINAL PAIN: ICD-10-CM

## 2021-04-12 PROCEDURE — 99242 OFF/OP CONSLTJ NEW/EST SF 20: CPT | Performed by: SURGERY

## 2021-04-12 NOTE — PROGRESS NOTES
Assessment/Plan:  Patient presents with episodic bloody drainage from the umbilicus  She states there is staining on her clothes intermittently  She describes it as mostly bloody with some clear liquid  It occurs 1 time per week over the last 6 months  There is no history for abdominal trauma  Although she has had gastric bypass surgery, the umbilical site was not used as an incision  There is no history for umbilical surgery  Examination reveals a normal umbilicus  Probing into the depth of the umbilicus reveals no opening or poor  There is no evidence for a bacterial infection  There is no surrounding erythema  CT scan of the abdomen and pelvis reveals umbilical hernia  There is no evidence for urachal cyst     The etiologies confounding  Most likely would be a stitch abscess, but there is no history of this  There is no history for umbilical or pelvic endometriosis  No urachal cyst is seen on imaging  I do not believe this is on colitis  For now I recommended a alcohol swab tucked into the umbilicus in daily basis  This still dehydrated tissues and keep it dry  I do not believe this will be successful, but it is a simple start  No medications were prescribed as there is no evidence for bacterial or fungal infection  This is not successful, exploration of the umbilicus would be needed to surgery for urachal cyst, and to repair umbilical hernia  She has a follow-up visit several weeks to assess her progress  Diagnoses and all orders for this visit:    Umbilical hernia    Abdominal pain    Biliary colic        Subjective:      Patient ID: Medardo Jones is a 52 y o  female  Patient presents for evaluation of bleeding/drainage from her navel for 7 months    CT abdomen and pelvis 9/15/2020: ABDOMINAL WALL/INGUINAL REGIONS:  Small fat-containing umbilical hernia, unchanged      The following portions of the patient's history were reviewed and updated as appropriate:     She  has a past medical history of Basal cell carcinoma, Diverticulitis of colon, Long term use of drug, and Shingles  She  has a past surgical history that includes Gastric bypass (03/29/2011); Tonsillectomy; and Knee surgery  Her family history includes Autoimmune disease in her father  She  reports that she has never smoked  She has never used smokeless tobacco  She reports that she does not drink alcohol or use drugs  Current Outpatient Medications   Medication Sig Dispense Refill    ascorbic acid (VITAMIN C) 500 mg tablet Take 1 tablet (500 mg total) by mouth every other day 90 tablet 1    ergocalciferol (VITAMIN D2) 50,000 units Take 1 capsule (50,000 Units total) by mouth every 28 days 12 capsule 0    ferrous sulfate 325 (65 Fe) mg tablet Take 1 tablet (325 mg total) by mouth every other day 90 tablet 1    Multiple Vitamin (MULTI-VITAMIN DAILY) TABS Take 1 tablet by mouth daily 90 tablet 3    omeprazole (PriLOSEC) 40 MG capsule Take 1 capsule (40 mg total) by mouth daily before breakfast 30 capsule 0    ondansetron (ZOFRAN) 4 mg tablet Take 1 tablet (4 mg total) by mouth every 8 (eight) hours as needed for nausea or vomiting 20 tablet 0     No current facility-administered medications for this visit  She has No Known Allergies       Review of Systems   Constitutional: Negative  Negative for activity change  HENT: Negative  Eyes: Negative  Respiratory: Negative  Cardiovascular: Negative  Gastrointestinal: Negative  Endocrine: Negative  Genitourinary: Negative  Musculoskeletal: Positive for back pain  Skin: Negative  Allergic/Immunologic: Negative  Neurological: Negative  Psychiatric/Behavioral: Negative for agitation, behavioral problems and confusion  The patient is not nervous/anxious  All other systems reviewed and are negative          Objective:      Ht 5' 9 5" (1 765 m)   Wt 119 kg (262 lb)   BMI 38 14 kg/m²          Physical Exam  Constitutional: Appearance: Normal appearance  She is well-developed  HENT:      Head: Normocephalic and atraumatic  Nose: Nose normal    Eyes:      Extraocular Movements: Extraocular movements intact  Conjunctiva/sclera: Conjunctivae normal    Neck:      Musculoskeletal: Normal range of motion  Cardiovascular:      Heart sounds: Normal heart sounds  Pulmonary:      Effort: Pulmonary effort is normal    Abdominal:      Palpations: Abdomen is soft  There is no mass  Tenderness: There is no abdominal tenderness  There is no rebound  Hernia: No hernia is present  Comments: Abdomen is obese  Evaluation of the umbilicus reveals no evidence for infection  There is no cellulitis  No punctate opening at its base  No evidence for endometriosis  Musculoskeletal: Normal range of motion  Skin:     General: Skin is warm and dry  Neurological:      Mental Status: She is alert and oriented to person, place, and time     Psychiatric:         Mood and Affect: Mood normal

## 2021-05-10 ENCOUNTER — OFFICE VISIT (OUTPATIENT)
Dept: SURGERY | Facility: CLINIC | Age: 51
End: 2021-05-10
Payer: COMMERCIAL

## 2021-05-10 DIAGNOSIS — R10.9 ABDOMINAL PAIN: Primary | ICD-10-CM

## 2021-05-10 DIAGNOSIS — R19.8 UMBILICAL DISCHARGE: ICD-10-CM

## 2021-05-10 DIAGNOSIS — K42.9 UMBILICAL HERNIA: ICD-10-CM

## 2021-05-10 PROBLEM — K80.50 BILIARY COLIC: Status: RESOLVED | Noted: 2021-04-12 | Resolved: 2021-05-10

## 2021-05-10 PROCEDURE — 99213 OFFICE O/P EST LOW 20 MIN: CPT | Performed by: SURGERY

## 2021-05-11 NOTE — PROGRESS NOTES
Assessment/Plan:  Patient returns for follow-up visit  She has intermittent episodes of blood through the umbilicus  This occurs approximately 1 time per week  Usually occurs at night  She has meticulous at cleaning the deep umbilicus, and she does not believe that it is secondary to bacterial fungal origin  There is no history for abdominal trauma  She did have gastric bypass surgery, however the umbilical site was not used as an incision  An alcohol wick into the umbilicus is not been helpful  Presently on examination the umbilicus has some blood-stained skin  There is no evidence for inflammation or abscess  The CT scan of the abdomen and pelvis revealed umbilical hernia  There Is no evidence for a urachal cyst     An ultrasound of her gallbladder was recommended to exclude cholelithiasis  If she did have gallstones, I would recommend removal as she has had a previous gastric bypass surgery  We discussed exploration of the umbilical region with repair of the umbilical hernia  She states that she would prefer to have the umbilicus excised as it is a constant concern for clean infection given her weight  I plan to contact her after the ultrasound and she is agreeable  Diagnoses and all orders for this visit:    Abdominal pain  -     US gallbladder; Future    Umbilical hernia    Umbilical discharge        Subjective:      Patient ID: Medardo Jones is a 52 y o  female  HPI    The following portions of the patient's history were reviewed and updated as appropriate:     She  has a past medical history of Basal cell carcinoma, Diverticulitis of colon, Long term use of drug, and Shingles  She  has a past surgical history that includes Gastric bypass (03/29/2011); Tonsillectomy; and Knee surgery  Her family history includes Autoimmune disease in her father  She  reports that she has never smoked   She has never used smokeless tobacco  She reports that she does not drink alcohol or use drugs   Current Outpatient Medications   Medication Sig Dispense Refill    ascorbic acid (VITAMIN C) 500 mg tablet Take 1 tablet (500 mg total) by mouth every other day 90 tablet 1    ergocalciferol (VITAMIN D2) 50,000 units Take 1 capsule (50,000 Units total) by mouth every 28 days 12 capsule 0    ferrous sulfate 325 (65 Fe) mg tablet Take 1 tablet (325 mg total) by mouth every other day 90 tablet 1    Multiple Vitamin (MULTI-VITAMIN DAILY) TABS Take 1 tablet by mouth daily 90 tablet 3    omeprazole (PriLOSEC) 40 MG capsule Take 1 capsule (40 mg total) by mouth daily before breakfast 30 capsule 0    ondansetron (ZOFRAN) 4 mg tablet Take 1 tablet (4 mg total) by mouth every 8 (eight) hours as needed for nausea or vomiting 20 tablet 0     No current facility-administered medications for this visit  She has No Known Allergies       Review of Systems   Constitutional: Negative  Negative for activity change  HENT: Negative  Eyes: Negative  Respiratory: Negative  Cardiovascular: Negative  Gastrointestinal: Negative  Endocrine: Negative  Genitourinary: Negative  Musculoskeletal: Negative  Skin: Positive for color change  Allergic/Immunologic: Negative  Neurological: Negative  Psychiatric/Behavioral: Negative for agitation, behavioral problems and confusion  The patient is not nervous/anxious  Objective: There were no vitals taken for this visit  Physical Exam  Constitutional:       Appearance: Normal appearance  She is well-developed  HENT:      Head: Normocephalic and atraumatic  Nose: Nose normal    Eyes:      Extraocular Movements: Extraocular movements intact  Conjunctiva/sclera: Conjunctivae normal    Neck:      Musculoskeletal: Normal range of motion  Cardiovascular:      Rate and Rhythm: Normal rate and regular rhythm  Heart sounds: Normal heart sounds     Pulmonary:      Effort: Pulmonary effort is normal       Breath sounds: Normal breath sounds  Abdominal:      General: Abdomen is flat  Palpations: Abdomen is soft  There is no mass  Tenderness: There is no rebound  Hernia: A hernia (Umbilical hernia is present ) is present  Musculoskeletal: Normal range of motion  Comments: There is some crusted blood on the skin about the umbilicus  Exploration of the umbilical site with Q-tips reveal a yellow discharge  No evidence for bleeding  No evidence for any cyst    Skin:     General: Skin is warm and dry  Neurological:      Mental Status: She is alert and oriented to person, place, and time     Psychiatric:         Mood and Affect: Mood normal

## 2021-05-11 NOTE — H&P (VIEW-ONLY)
Assessment/Plan:  Patient returns for follow-up visit  She has intermittent episodes of blood through the umbilicus  This occurs approximately 1 time per week  Usually occurs at night  She has meticulous at cleaning the deep umbilicus, and she does not believe that it is secondary to bacterial fungal origin  There is no history for abdominal trauma  She did have gastric bypass surgery, however the umbilical site was not used as an incision  An alcohol wick into the umbilicus is not been helpful  Presently on examination the umbilicus has some blood-stained skin  There is no evidence for inflammation or abscess  The CT scan of the abdomen and pelvis revealed umbilical hernia  There Is no evidence for a urachal cyst     An ultrasound of her gallbladder was recommended to exclude cholelithiasis  If she did have gallstones, I would recommend removal as she has had a previous gastric bypass surgery  We discussed exploration of the umbilical region with repair of the umbilical hernia  She states that she would prefer to have the umbilicus excised as it is a constant concern for clean infection given her weight  I plan to contact her after the ultrasound and she is agreeable  Diagnoses and all orders for this visit:    Abdominal pain  -     US gallbladder; Future    Umbilical hernia    Umbilical discharge        Subjective:      Patient ID: Hermilo Portillo is a 52 y o  female  HPI    The following portions of the patient's history were reviewed and updated as appropriate:     She  has a past medical history of Basal cell carcinoma, Diverticulitis of colon, Long term use of drug, and Shingles  She  has a past surgical history that includes Gastric bypass (03/29/2011); Tonsillectomy; and Knee surgery  Her family history includes Autoimmune disease in her father  She  reports that she has never smoked   She has never used smokeless tobacco  She reports that she does not drink alcohol or use drugs   Current Outpatient Medications   Medication Sig Dispense Refill    ascorbic acid (VITAMIN C) 500 mg tablet Take 1 tablet (500 mg total) by mouth every other day 90 tablet 1    ergocalciferol (VITAMIN D2) 50,000 units Take 1 capsule (50,000 Units total) by mouth every 28 days 12 capsule 0    ferrous sulfate 325 (65 Fe) mg tablet Take 1 tablet (325 mg total) by mouth every other day 90 tablet 1    Multiple Vitamin (MULTI-VITAMIN DAILY) TABS Take 1 tablet by mouth daily 90 tablet 3    omeprazole (PriLOSEC) 40 MG capsule Take 1 capsule (40 mg total) by mouth daily before breakfast 30 capsule 0    ondansetron (ZOFRAN) 4 mg tablet Take 1 tablet (4 mg total) by mouth every 8 (eight) hours as needed for nausea or vomiting 20 tablet 0     No current facility-administered medications for this visit  She has No Known Allergies       Review of Systems   Constitutional: Negative  Negative for activity change  HENT: Negative  Eyes: Negative  Respiratory: Negative  Cardiovascular: Negative  Gastrointestinal: Negative  Endocrine: Negative  Genitourinary: Negative  Musculoskeletal: Negative  Skin: Positive for color change  Allergic/Immunologic: Negative  Neurological: Negative  Psychiatric/Behavioral: Negative for agitation, behavioral problems and confusion  The patient is not nervous/anxious  Objective: There were no vitals taken for this visit  Physical Exam  Constitutional:       Appearance: Normal appearance  She is well-developed  HENT:      Head: Normocephalic and atraumatic  Nose: Nose normal    Eyes:      Extraocular Movements: Extraocular movements intact  Conjunctiva/sclera: Conjunctivae normal    Neck:      Musculoskeletal: Normal range of motion  Cardiovascular:      Rate and Rhythm: Normal rate and regular rhythm  Heart sounds: Normal heart sounds     Pulmonary:      Effort: Pulmonary effort is normal       Breath sounds: Normal breath sounds  Abdominal:      General: Abdomen is flat  Palpations: Abdomen is soft  There is no mass  Tenderness: There is no rebound  Hernia: A hernia (Umbilical hernia is present ) is present  Musculoskeletal: Normal range of motion  Comments: There is some crusted blood on the skin about the umbilicus  Exploration of the umbilical site with Q-tips reveal a yellow discharge  No evidence for bleeding  No evidence for any cyst    Skin:     General: Skin is warm and dry  Neurological:      Mental Status: She is alert and oriented to person, place, and time     Psychiatric:         Mood and Affect: Mood normal

## 2021-05-13 ENCOUNTER — HOSPITAL ENCOUNTER (OUTPATIENT)
Dept: RADIOLOGY | Facility: HOSPITAL | Age: 51
Discharge: HOME | End: 2021-05-13
Attending: SURGERY
Payer: COMMERCIAL

## 2021-05-13 DIAGNOSIS — R10.9 ABDOMINAL PAIN: ICD-10-CM

## 2021-05-13 PROCEDURE — 76705 ECHO EXAM OF ABDOMEN: CPT

## 2021-05-17 ENCOUNTER — PREP FOR PROCEDURE (OUTPATIENT)
Dept: SURGERY | Facility: CLINIC | Age: 51
End: 2021-05-17

## 2021-05-17 DIAGNOSIS — K82.8 BILIARY DYSKINESIA: Primary | ICD-10-CM

## 2021-05-17 DIAGNOSIS — K42.9 UMBILICAL HERNIA: ICD-10-CM

## 2021-05-17 DIAGNOSIS — R19.8 UMBILICAL BLEEDING: ICD-10-CM

## 2021-05-18 NOTE — DISCHARGE INSTRUCTIONS
Please call the office when you leave to schedule an appointment for 2 weeks  This can be a virtual / telephone consultation or it can be in person  The choice is yours  Please call [phone]   [address], [address], [facility], [address]  Off of [address] between [facility] and [facility]  Activity:    May lift 10 lb as many times as desired the 1st week,       20 lb in 2 weeks,       30 lb in 3 weeks  Walking is encouraged  Normal daily activities including climbing steps are okay  Do not engage in strenuous activity ( sit-ups or crutches) or contact sports for 4-6 weeks post-operatively    Return to Work:   Okay to return to work when you feel well if you desire  Diet:   You may return to your normal healthy diet  Wound Care: Your wound is closed with dissolvable stitches and glue  It is okay to shower  Wash incision gently with soap and water and pat dry  Do not soak incisions in bath water or swim for two weeks  Do not apply any creams or ointments  Pain Medication:   Please take as directed if needed  May use Advil or Motrin in addition  Recall, the pain medicine and anesthesia is associated with constipation  No driving while taking narcotic pain medications  Other: It is normal to developed a healing ridge / firm incision after surgery  This is your body making scar tissue  It is a good sign  Constipation is very common after general anesthesia  Please use milk of magnesia as needed in order to help prevent constipation  It is normal to get bruising after surgery  If you have questions after discharge please call the office      If you have increased pain, fever >101 5, increased drainage, redness or a bad smell at your surgery site, please call us immediately or come directly to the Emergency Room 3 or 4

## 2021-05-19 NOTE — PRE-PROCEDURE INSTRUCTIONS
Pre-Surgery Instructions:   Medication Instructions    ascorbic acid (VITAMIN C) 500 mg tablet Instructed patient per Anesthesia Guidelines   ergocalciferol (VITAMIN D2) 50,000 units Instructed patient per Anesthesia Guidelines   ferrous sulfate 325 (65 Fe) mg tablet Instructed patient per Anesthesia Guidelines   Multiple Vitamin (MULTI-VITAMIN DAILY) TABS Instructed patient per Anesthesia Guidelines  Pre-op and bathing instructions given  Pt has hibiclens  Advised pt to hold multivitamin/asa/ibuprofen until after sx  Pt will not take kusum meds am DOS

## 2021-05-21 ENCOUNTER — ANESTHESIA EVENT (OUTPATIENT)
Dept: PERIOP | Facility: HOSPITAL | Age: 51
End: 2021-05-21
Payer: COMMERCIAL

## 2021-05-21 ENCOUNTER — ANESTHESIA (OUTPATIENT)
Dept: PERIOP | Facility: HOSPITAL | Age: 51
End: 2021-05-21
Payer: COMMERCIAL

## 2021-05-21 ENCOUNTER — HOSPITAL ENCOUNTER (OUTPATIENT)
Facility: HOSPITAL | Age: 51
Setting detail: OUTPATIENT SURGERY
Discharge: HOME | End: 2021-05-21
Attending: SURGERY | Admitting: SURGERY
Payer: COMMERCIAL

## 2021-05-21 VITALS
TEMPERATURE: 97 F | HEIGHT: 70 IN | RESPIRATION RATE: 18 BRPM | DIASTOLIC BLOOD PRESSURE: 72 MMHG | SYSTOLIC BLOOD PRESSURE: 134 MMHG | OXYGEN SATURATION: 98 % | HEART RATE: 61 BPM | BODY MASS INDEX: 37.51 KG/M2 | WEIGHT: 262 LBS

## 2021-05-21 DIAGNOSIS — R19.8 UMBILICAL BLEEDING: ICD-10-CM

## 2021-05-21 DIAGNOSIS — K42.9 UMBILICAL HERNIA: Primary | ICD-10-CM

## 2021-05-21 DIAGNOSIS — K82.8 BILIARY DYSKINESIA: ICD-10-CM

## 2021-05-21 PROBLEM — R11.2 PONV (POSTOPERATIVE NAUSEA AND VOMITING): Status: ACTIVE | Noted: 2021-05-21

## 2021-05-21 PROBLEM — Z98.890 PONV (POSTOPERATIVE NAUSEA AND VOMITING): Status: ACTIVE | Noted: 2021-05-21

## 2021-05-21 LAB
EXT PREGNANCY TEST URINE: NEGATIVE
EXT. CONTROL: NORMAL

## 2021-05-21 PROCEDURE — 88304 TISSUE EXAM BY PATHOLOGIST: CPT | Performed by: PATHOLOGY

## 2021-05-21 PROCEDURE — 47562 LAPAROSCOPIC CHOLECYSTECTOMY: CPT | Performed by: SURGERY

## 2021-05-21 PROCEDURE — 88305 TISSUE EXAM BY PATHOLOGIST: CPT | Performed by: PATHOLOGY

## 2021-05-21 PROCEDURE — 81025 URINE PREGNANCY TEST: CPT | Performed by: SURGERY

## 2021-05-21 RX ORDER — KETOROLAC TROMETHAMINE 30 MG/ML
INJECTION, SOLUTION INTRAMUSCULAR; INTRAVENOUS AS NEEDED
Status: DISCONTINUED | OUTPATIENT
Start: 2021-05-21 | End: 2021-05-21

## 2021-05-21 RX ORDER — ONDANSETRON 2 MG/ML
4 INJECTION INTRAMUSCULAR; INTRAVENOUS EVERY 4 HOURS PRN
Status: DISCONTINUED | OUTPATIENT
Start: 2021-05-21 | End: 2021-05-21 | Stop reason: HOSPADM

## 2021-05-21 RX ORDER — PROPOFOL 10 MG/ML
INJECTION, EMULSION INTRAVENOUS CONTINUOUS PRN
Status: DISCONTINUED | OUTPATIENT
Start: 2021-05-21 | End: 2021-05-21

## 2021-05-21 RX ORDER — HYDROCODONE BITARTRATE AND ACETAMINOPHEN 5; 325 MG/1; MG/1
1 TABLET ORAL EVERY 6 HOURS PRN
Status: DISCONTINUED | OUTPATIENT
Start: 2021-05-21 | End: 2021-05-21 | Stop reason: HOSPADM

## 2021-05-21 RX ORDER — ROCURONIUM BROMIDE 10 MG/ML
INJECTION, SOLUTION INTRAVENOUS AS NEEDED
Status: DISCONTINUED | OUTPATIENT
Start: 2021-05-21 | End: 2021-05-21

## 2021-05-21 RX ORDER — OXYCODONE HYDROCHLORIDE AND ACETAMINOPHEN 5; 325 MG/1; MG/1
1 TABLET ORAL EVERY 4 HOURS PRN
Qty: 20 TABLET | Refills: 0 | Status: SHIPPED | OUTPATIENT
Start: 2021-05-21 | End: 2021-05-31

## 2021-05-21 RX ORDER — MAGNESIUM HYDROXIDE 1200 MG/15ML
LIQUID ORAL AS NEEDED
Status: DISCONTINUED | OUTPATIENT
Start: 2021-05-21 | End: 2021-05-21 | Stop reason: HOSPADM

## 2021-05-21 RX ORDER — PROPOFOL 10 MG/ML
INJECTION, EMULSION INTRAVENOUS AS NEEDED
Status: DISCONTINUED | OUTPATIENT
Start: 2021-05-21 | End: 2021-05-21

## 2021-05-21 RX ORDER — METOCLOPRAMIDE HYDROCHLORIDE 5 MG/ML
10 INJECTION INTRAMUSCULAR; INTRAVENOUS EVERY 6 HOURS PRN
Status: DISCONTINUED | OUTPATIENT
Start: 2021-05-21 | End: 2021-05-21 | Stop reason: HOSPADM

## 2021-05-21 RX ORDER — ONDANSETRON 2 MG/ML
4 INJECTION INTRAMUSCULAR; INTRAVENOUS ONCE AS NEEDED
Status: COMPLETED | OUTPATIENT
Start: 2021-05-21 | End: 2021-05-21

## 2021-05-21 RX ORDER — FENTANYL CITRATE 50 UG/ML
INJECTION, SOLUTION INTRAMUSCULAR; INTRAVENOUS AS NEEDED
Status: DISCONTINUED | OUTPATIENT
Start: 2021-05-21 | End: 2021-05-21

## 2021-05-21 RX ORDER — SODIUM CHLORIDE, SODIUM LACTATE, POTASSIUM CHLORIDE, CALCIUM CHLORIDE 600; 310; 30; 20 MG/100ML; MG/100ML; MG/100ML; MG/100ML
INJECTION, SOLUTION INTRAVENOUS CONTINUOUS PRN
Status: DISCONTINUED | OUTPATIENT
Start: 2021-05-21 | End: 2021-05-21

## 2021-05-21 RX ORDER — BUPIVACAINE HYDROCHLORIDE 2.5 MG/ML
INJECTION, SOLUTION EPIDURAL; INFILTRATION; INTRACAUDAL AS NEEDED
Status: DISCONTINUED | OUTPATIENT
Start: 2021-05-21 | End: 2021-05-21 | Stop reason: HOSPADM

## 2021-05-21 RX ORDER — CEFAZOLIN SODIUM 2 G/50ML
2000 SOLUTION INTRAVENOUS ONCE
Status: COMPLETED | OUTPATIENT
Start: 2021-05-21 | End: 2021-05-21

## 2021-05-21 RX ORDER — NEOSTIGMINE METHYLSULFATE 1 MG/ML
INJECTION INTRAVENOUS AS NEEDED
Status: DISCONTINUED | OUTPATIENT
Start: 2021-05-21 | End: 2021-05-21

## 2021-05-21 RX ORDER — SODIUM CHLORIDE 9 MG/ML
INJECTION, SOLUTION INTRAVENOUS AS NEEDED
Status: DISCONTINUED | OUTPATIENT
Start: 2021-05-21 | End: 2021-05-21 | Stop reason: HOSPADM

## 2021-05-21 RX ORDER — DEXAMETHASONE SODIUM PHOSPHATE 10 MG/ML
INJECTION, SOLUTION INTRAMUSCULAR; INTRAVENOUS AS NEEDED
Status: DISCONTINUED | OUTPATIENT
Start: 2021-05-21 | End: 2021-05-21

## 2021-05-21 RX ORDER — HYDROMORPHONE HCL 110MG/55ML
PATIENT CONTROLLED ANALGESIA SYRINGE INTRAVENOUS AS NEEDED
Status: DISCONTINUED | OUTPATIENT
Start: 2021-05-21 | End: 2021-05-21

## 2021-05-21 RX ORDER — SODIUM CHLORIDE, SODIUM LACTATE, POTASSIUM CHLORIDE, CALCIUM CHLORIDE 600; 310; 30; 20 MG/100ML; MG/100ML; MG/100ML; MG/100ML
100 INJECTION, SOLUTION INTRAVENOUS CONTINUOUS
Status: DISCONTINUED | OUTPATIENT
Start: 2021-05-21 | End: 2021-05-21 | Stop reason: HOSPADM

## 2021-05-21 RX ORDER — FENTANYL CITRATE/PF 50 MCG/ML
25 SYRINGE (ML) INJECTION
Status: DISCONTINUED | OUTPATIENT
Start: 2021-05-21 | End: 2021-05-21 | Stop reason: HOSPADM

## 2021-05-21 RX ORDER — MIDAZOLAM HYDROCHLORIDE 2 MG/2ML
INJECTION, SOLUTION INTRAMUSCULAR; INTRAVENOUS AS NEEDED
Status: DISCONTINUED | OUTPATIENT
Start: 2021-05-21 | End: 2021-05-21

## 2021-05-21 RX ORDER — ACETAMINOPHEN 325 MG/1
650 TABLET ORAL EVERY 4 HOURS PRN
Status: DISCONTINUED | OUTPATIENT
Start: 2021-05-21 | End: 2021-05-21 | Stop reason: HOSPADM

## 2021-05-21 RX ORDER — ONDANSETRON 2 MG/ML
INJECTION INTRAMUSCULAR; INTRAVENOUS AS NEEDED
Status: DISCONTINUED | OUTPATIENT
Start: 2021-05-21 | End: 2021-05-21

## 2021-05-21 RX ORDER — GLYCOPYRROLATE 0.2 MG/ML
INJECTION INTRAMUSCULAR; INTRAVENOUS AS NEEDED
Status: DISCONTINUED | OUTPATIENT
Start: 2021-05-21 | End: 2021-05-21

## 2021-05-21 RX ORDER — HYDROMORPHONE HCL/PF 1 MG/ML
0.2 SYRINGE (ML) INJECTION
Status: DISCONTINUED | OUTPATIENT
Start: 2021-05-21 | End: 2021-05-21 | Stop reason: HOSPADM

## 2021-05-21 RX ORDER — LIDOCAINE HYDROCHLORIDE 10 MG/ML
INJECTION, SOLUTION EPIDURAL; INFILTRATION; INTRACAUDAL; PERINEURAL AS NEEDED
Status: DISCONTINUED | OUTPATIENT
Start: 2021-05-21 | End: 2021-05-21

## 2021-05-21 RX ORDER — OXYCODONE HYDROCHLORIDE AND ACETAMINOPHEN 5; 325 MG/1; MG/1
1 TABLET ORAL EVERY 4 HOURS PRN
Status: DISCONTINUED | OUTPATIENT
Start: 2021-05-21 | End: 2021-05-21 | Stop reason: HOSPADM

## 2021-05-21 RX ADMIN — NEOSTIGMINE METHYLSULFATE 2 MG: 1 INJECTION INTRAVENOUS at 13:26

## 2021-05-21 RX ADMIN — METOCLOPRAMIDE 10 MG: 5 INJECTION, SOLUTION INTRAMUSCULAR; INTRAVENOUS at 14:44

## 2021-05-21 RX ADMIN — MIDAZOLAM HYDROCHLORIDE 2 MG: 1 INJECTION, SOLUTION INTRAMUSCULAR; INTRAVENOUS at 12:10

## 2021-05-21 RX ADMIN — KETOROLAC TROMETHAMINE 15 MG: 30 INJECTION, SOLUTION INTRAMUSCULAR at 13:11

## 2021-05-21 RX ADMIN — DEXAMETHASONE SODIUM PHOSPHATE 4 MG: 10 INJECTION, SOLUTION INTRAMUSCULAR; INTRAVENOUS at 12:20

## 2021-05-21 RX ADMIN — PROPOFOL 200 MG: 10 INJECTION, EMULSION INTRAVENOUS at 12:15

## 2021-05-21 RX ADMIN — SODIUM CHLORIDE, SODIUM LACTATE, POTASSIUM CHLORIDE, AND CALCIUM CHLORIDE: .6; .31; .03; .02 INJECTION, SOLUTION INTRAVENOUS at 12:05

## 2021-05-21 RX ADMIN — GLYCOPYRROLATE 0.2 MG: 0.2 INJECTION, SOLUTION INTRAMUSCULAR; INTRAVENOUS at 13:26

## 2021-05-21 RX ADMIN — ONDANSETRON 4 MG: 2 INJECTION INTRAMUSCULAR; INTRAVENOUS at 14:05

## 2021-05-21 RX ADMIN — FENTANYL CITRATE 100 MCG: 50 INJECTION, SOLUTION INTRAMUSCULAR; INTRAVENOUS at 12:15

## 2021-05-21 RX ADMIN — CEFAZOLIN SODIUM 2000 MG: 2 SOLUTION INTRAVENOUS at 12:10

## 2021-05-21 RX ADMIN — LIDOCAINE HYDROCHLORIDE 50 MG: 10 INJECTION, SOLUTION EPIDURAL; INFILTRATION; INTRACAUDAL at 12:15

## 2021-05-21 RX ADMIN — PROPOFOL 40 MCG/KG/MIN: 10 INJECTION, EMULSION INTRAVENOUS at 12:31

## 2021-05-21 RX ADMIN — ONDANSETRON 4 MG: 2 INJECTION INTRAMUSCULAR; INTRAVENOUS at 12:49

## 2021-05-21 RX ADMIN — ROCURONIUM BROMIDE 35 MG: 10 SOLUTION INTRAVENOUS at 12:15

## 2021-05-21 RX ADMIN — HYDROMORPHONE HYDROCHLORIDE 0.5 MG: 2 INJECTION, SOLUTION INTRAMUSCULAR; INTRAVENOUS; SUBCUTANEOUS at 12:40

## 2021-05-21 NOTE — INTERVAL H&P NOTE
H&P reviewed  After examining the patient I find no changes in the patients condition since the H&P had been written      Vitals:    05/21/21 1049   BP: 139/89   Pulse: 83   Resp: 20   Temp: (!) 97 1 °F (36 2 °C)   SpO2: 99%

## 2021-05-21 NOTE — ANESTHESIA POSTPROCEDURE EVALUATION
Post-Op Assessment Note    CV Status:  Stable    Pain management: adequate     Mental Status:  Alert and awake   Hydration Status:  Euvolemic   PONV Controlled:  Controlled   Airway Patency:  Patent      Post Op Vitals Reviewed: Yes      Staff: CRNA   Comments: vss report rn        No complications documented      BP   128/63   Temp     Pulse  59   Resp      SpO2   98 RA

## 2021-05-21 NOTE — OP NOTE
OPERATIVE REPORT  PATIENT NAME: Luzmaria Burns    :  1970  MRN: [id number]  Pt Location: AN OR ROOM 04    SURGERY DATE: 2021    Surgeon(s) and Role:     * Magdalena Muñoz MD - Primary     * Dorian Gatica MD - Assisting    Preop Diagnosis:  Biliary dyskinesia [W27 1]  Umbilical hernia [L61 4]  Umbilical bleeding [U38 4]    Post-Op Diagnosis Codes:     * Biliary dyskinesia [E22 3]     * Umbilical hernia [J53 9]     * Umbilical bleeding [D97 0] with inflammation    Procedure(s) (LRB):  LAPAROSCOPIC CHOLECYSTECTOMY (N/A)  UMBILICAL HERNIA REPAIR, REMOVAL OF UMBILICUS (N/A) (umbilical ectomy)    Specimen(s):  ID Type Source Tests Collected by Time Destination   1 :  Tissue Gallbladder TISSUE EXAM Magdalena Muñoz MD 2021 31:78 PM    2 : umbilicus  Tissue Soft Tissue, Other TISSUE EXAM Magdalena Muñoz MD 2021 12:58 PM        Estimated Blood Loss:   Minimal    Drains:  * No LDAs found *    Anesthesia Type:   General    Operative Indications:  Biliary dyskinesia [R59 2]  Umbilical hernia [J95 4]  Umbilical bleeding [X54 5] with inflammation       Operative Findings:  Independent, nonsmoker, ASA 2, wound class 2, BMI 38, weight 262, height 69  (+) PONV (postoperative nausea and vomiting)       CARDIO   (+) Hyperlipidemia   (+) Hypertension       HEMATOLOGY   (+) Anemia                     Complications:   None    Procedure and Technique:  Luzmaria Burns is a 52 y o  female was brought into the operative suite and identified visually and by arm band  The patient was placed in the supine position  Careful attention towards positioning of extremities was completed  After sterile prep and drape a timeout was completed  Athrombic pumps in place  Antibiotics provided  After instillation of local analgesia an incision was made above the umbilicus   With blunt dissection the peritoneum was identified  This was pulled upward using Kocher clamps  An incision was made    Hemostat was used to bluntly puncture the peritoneum  Intra-abdominal location was verified  A trocar was then inserted  CO2 was then insufflated with a back pressure of 15  After Marcaine instillation at each additional site, additional trochars are placed under direct vision into the upper aspect of the abdomen  Laparoscopic visualization revealed a normal liver and normal stomach  No excess peritoneal fluid  The gallbladder was pulled with traction inferiorly, and the liver was pushed superior  A dome down technique was completed using electrocautery  This technique carried down to the level of Martin's pouch  Careful attention was made towards location of the right hepatic artery, cystic artery, common bile duct, right hepatic duct and the cystic duct  Careful attention was made towards the critical anatomy at this region  The cystic duct was identified inserting into the base of the gallbladder  Cystic artery was identified also inserting into the base of the gallbladder  The cystic duct was then clipped ×3 and divided  The cystic artery was clipped ×3 and divided  The gallbladder was then removed from the liver bed with additional electrocautery       The area was copiously irrigated  Hemostasis was assured  The gallbladder was placed into an Endo-Catch bag, and was then removed through the umbilical incision  The area about the umbilicus was then viewed laparoscopically  No evidence for urachal remnant  Umbilical hernias noted  No evidence for mass effect  Tissue around the umbilicus was then excised full-thickness skin and subcutaneous tissue down the fascia  The umbilical stalk was then removed off of its base exposing the umbilical hernia defect  Fascia was then closed repairing the hernia with interrupted 1  Vicryl  This followed by 2 0 Vicryl subcutaneous and 4 Monocryl sutures  Dermabond was applied  Patient tolerated this procedure well  Sponge instrument count correct x2         I was present for the entire procedure    Patient Disposition:  PACU     SIGNATURE: Marry Carey MD  DATE: May 21, 2021  TIME: 1:26 PM

## 2021-05-21 NOTE — ANESTHESIA PREPROCEDURE EVALUATION
Procedure:  LAPAROSCOPIC CHOLECYSTECTOMY (N/A Abdomen)  UMBILICAL HERNIA REPAIR, REMOVAL OF UMBILICUS (N/A Abdomen)    Relevant Problems   ANESTHESIA   (+) PONV (postoperative nausea and vomiting)      CARDIO   (+) Hyperlipidemia   (+) Hypertension      HEMATOLOGY   (+) Anemia        Physical Exam    Airway    Mallampati score: II  TM Distance: >3 FB  Neck ROM: full     Dental   No notable dental hx     Cardiovascular  Cardiovascular exam normal    Pulmonary  Pulmonary exam normal     Other Findings        Anesthesia Plan  ASA Score- 3     Anesthesia Type- general with ASA Monitors  Additional Monitors:   Airway Plan: ETT  Plan Factors-Exercise tolerance (METS): >4 METS  Chart reviewed  Patient summary reviewed  Patient is not a current smoker  Patient did not smoke on day of surgery  Induction- intravenous  Postoperative Plan- Plan for postoperative opioid use  Planned trial extubation    Informed Consent- Anesthetic plan and risks discussed with patient  I personally reviewed this patient with the CRNA  Discussed and agreed on the Anesthesia Plan with the CRNA Gerhardt Sep

## 2021-06-07 ENCOUNTER — OFFICE VISIT (OUTPATIENT)
Dept: SURGERY | Facility: CLINIC | Age: 51
End: 2021-06-07

## 2021-06-07 DIAGNOSIS — Z48.89 POSTOPERATIVE VISIT: Primary | ICD-10-CM

## 2021-06-07 PROCEDURE — 99024 POSTOP FOLLOW-UP VISIT: CPT | Performed by: SURGERY

## 2021-06-07 NOTE — PROGRESS NOTES
Assessment/Plan:  Patient had excision of her umbilicus for intermittent bloody discharge  Pathology is unremarkable  In addition she had laparoscopic cholecystectomy for a hydrops gallbladder  She has recovered well  She has advance her activities well  Her incisions are clean and healing nicely  Diet and activity instructions provided  All questions were answered  Diagnoses and all orders for this visit:    Postoperative visit        Pathology: Reviewed with patient, all questions answered  Postoperative restrictions reviewed  All questions answered  ______________________________________________________  HPI: Patient presents post operatively  Laparoscopic cholecystectomy, umbilical hernia repair, removal of umbilicus 8/93/2017  Final Diagnosis  A  Gallbladder:  - Chronic cholecystitis  - Negative for malignancy     B  Soft Tissue, Other, umbilicus:  - Skin, fibroadipose and connective tissue consistent with hernia sac  - Negative for malignancy  ROS:  General ROS: negative for - chills, fatigue, fever or night sweats, weight loss  Respiratory ROS: no cough, shortness of breath, or wheezing  Cardiovascular ROS: no chest pain or dyspnea on exertion  Genito-Urinary ROS: no dysuria, trouble voiding, or hematuria  Musculoskeletal ROS: negative for - gait disturbance, joint pain or muscle pain  Neurological ROS: no TIA or stroke symptoms  GI ROS: see HPI  Skin ROS: no new rashes or lesions   Lymphatic ROS: no new adenopathy noted by pt     GYN ROS: see HPI, no new GYN history or bleeding noted  Psy ROS: no new mental or behavioral disturbances         Patient Active Problem List   Diagnosis    Anemia    Diverticulosis    Hyperlipidemia    Hypertension    Iron deficiency    Vitamin B12 deficiency    Vitamin D deficiency    Status post gastric bypass for obesity    Pre-diabetes    Injury of plantar plate of left foot    Plantar fasciitis    Lesion of upper extremity  Leukocytosis    Abdominal pain    Umbilical hernia    Umbilical discharge    PONV (postoperative nausea and vomiting)    Postoperative visit       Allergies:  Patient has no known allergies        Current Outpatient Medications:     ascorbic acid (VITAMIN C) 500 mg tablet, Take 1 tablet (500 mg total) by mouth every other day, Disp: 90 tablet, Rfl: 1    ergocalciferol (VITAMIN D2) 50,000 units, Take 1 capsule (50,000 Units total) by mouth every 28 days, Disp: 12 capsule, Rfl: 0    ferrous sulfate 325 (65 Fe) mg tablet, Take 1 tablet (325 mg total) by mouth every other day, Disp: 90 tablet, Rfl: 1    Multiple Vitamin (MULTI-VITAMIN DAILY) TABS, Take 1 tablet by mouth daily, Disp: 90 tablet, Rfl: 3    omeprazole (PriLOSEC) 40 MG capsule, Take 1 capsule (40 mg total) by mouth daily before breakfast, Disp: 30 capsule, Rfl: 0    ondansetron (ZOFRAN) 4 mg tablet, Take 1 tablet (4 mg total) by mouth every 8 (eight) hours as needed for nausea or vomiting, Disp: 20 tablet, Rfl: 0    Past Medical History:   Diagnosis Date    Anemia     Basal cell carcinoma      Left forearm 4/01/2019    Diverticulitis of colon     Hypertension     Iron deficiency     Long term use of drug     resolved 11/27/15    PONV (postoperative nausea and vomiting)     Pt requests to be pre-medicated for N/V prior to sx    Shingles     Umbilical hernia        Past Surgical History:   Procedure Laterality Date    GASTRIC BYPASS  03/29/2011    for morbid obesity    KNEE SURGERY      NM LAP,CHOLECYSTECTOMY N/A 5/21/2021    Procedure: LAPAROSCOPIC CHOLECYSTECTOMY;  Surgeon: Elen Shore MD;  [facility];  Service: General    NM REPAIR UMBILICAL GCIH,0+W/U,GJETJ N/A 5/21/2021    Procedure: UMBILICAL HERNIA REPAIR, REMOVAL OF UMBILICUS;  Surgeon: Elen Shore MD;  [facility];  Service: General    TONSILLECTOMY         Family History   Problem Relation Age of Onset    Autoimmune disease Father         reports that she has never smoked  She has never used smokeless tobacco  She reports that she does not drink alcohol or use drugs  PHYSICAL EXAM    There were no vitals taken for this visit      General: normal, cooperative, no distress  Abdominal: soft, nondistended or nontender  Incision: clean, dry, and intact and healing well      Ronaldo Gutiérrez MD    Date: 6/7/2021 Time: 10:11 PM

## 2021-06-26 ENCOUNTER — APPOINTMENT (OUTPATIENT)
Dept: LAB | Facility: CLINIC | Age: 51
End: 2021-06-26
Payer: COMMERCIAL

## 2021-06-26 ENCOUNTER — APPOINTMENT (OUTPATIENT)
Dept: LAB | Facility: CLINIC | Age: 51
End: 2021-06-26

## 2021-06-26 DIAGNOSIS — Z00.8 HEALTH EXAMINATION IN POPULATION SURVEY: ICD-10-CM

## 2021-06-26 DIAGNOSIS — Z13.1 SCREENING FOR DIABETES MELLITUS: ICD-10-CM

## 2021-06-26 DIAGNOSIS — Z98.84 STATUS POST GASTRIC BYPASS FOR OBESITY: ICD-10-CM

## 2021-06-26 DIAGNOSIS — E61.1 IRON DEFICIENCY: ICD-10-CM

## 2021-06-26 DIAGNOSIS — E78.5 HYPERLIPIDEMIA, UNSPECIFIED HYPERLIPIDEMIA TYPE: ICD-10-CM

## 2021-06-26 LAB
25(OH)D3 SERPL-MCNC: 32.4 NG/ML (ref 30–100)
ALBUMIN SERPL BCP-MCNC: 3.5 G/DL (ref 3.5–5)
ALP SERPL-CCNC: 99 U/L (ref 46–116)
ALT SERPL W P-5'-P-CCNC: 17 U/L (ref 12–78)
ANION GAP SERPL CALCULATED.3IONS-SCNC: 6 MMOL/L (ref 4–13)
AST SERPL W P-5'-P-CCNC: 14 U/L (ref 5–45)
BASOPHILS # BLD AUTO: 0.05 THOUSANDS/ΜL (ref 0–0.1)
BASOPHILS NFR BLD AUTO: 1 % (ref 0–1)
BILIRUB SERPL-MCNC: 0.47 MG/DL (ref 0.2–1)
BUN SERPL-MCNC: 11 MG/DL (ref 5–25)
CALCIUM SERPL-MCNC: 9.1 MG/DL (ref 8.3–10.1)
CHLORIDE SERPL-SCNC: 108 MMOL/L (ref 100–108)
CHOLEST SERPL-MCNC: 152 MG/DL (ref 50–200)
CO2 SERPL-SCNC: 28 MMOL/L (ref 21–32)
CREAT SERPL-MCNC: 0.6 MG/DL (ref 0.6–1.3)
EOSINOPHIL # BLD AUTO: 0.15 THOUSAND/ΜL (ref 0–0.61)
EOSINOPHIL NFR BLD AUTO: 2 % (ref 0–6)
ERYTHROCYTE [DISTWIDTH] IN BLOOD BY AUTOMATED COUNT: 13.7 % (ref 11.6–15.1)
FOLATE SERPL-MCNC: 12.5 NG/ML (ref 3.1–17.5)
GFR SERPL CREATININE-BSD FRML MDRD: 108 ML/MIN/1.73SQ M
GLUCOSE P FAST SERPL-MCNC: 99 MG/DL (ref 65–99)
HCT VFR BLD AUTO: 43.9 % (ref 34.8–46.1)
HDLC SERPL-MCNC: 50 MG/DL
HGB BLD-MCNC: 14 G/DL (ref 11.5–15.4)
IMM GRANULOCYTES # BLD AUTO: 0.02 THOUSAND/UL (ref 0–0.2)
IMM GRANULOCYTES NFR BLD AUTO: 0 % (ref 0–2)
LDLC SERPL CALC-MCNC: 88 MG/DL (ref 0–100)
LYMPHOCYTES # BLD AUTO: 2.29 THOUSANDS/ΜL (ref 0.6–4.47)
LYMPHOCYTES NFR BLD AUTO: 33 % (ref 14–44)
MCH RBC QN AUTO: 29.4 PG (ref 26.8–34.3)
MCHC RBC AUTO-ENTMCNC: 31.9 G/DL (ref 31.4–37.4)
MCV RBC AUTO: 92 FL (ref 82–98)
MONOCYTES # BLD AUTO: 0.41 THOUSAND/ΜL (ref 0.17–1.22)
MONOCYTES NFR BLD AUTO: 6 % (ref 4–12)
NEUTROPHILS # BLD AUTO: 4.05 THOUSANDS/ΜL (ref 1.85–7.62)
NEUTS SEG NFR BLD AUTO: 58 % (ref 43–75)
NRBC BLD AUTO-RTO: 0 /100 WBCS
PLATELET # BLD AUTO: 246 THOUSANDS/UL (ref 149–390)
PMV BLD AUTO: 12.3 FL (ref 8.9–12.7)
POTASSIUM SERPL-SCNC: 4.6 MMOL/L (ref 3.5–5.3)
PROT SERPL-MCNC: 6.8 G/DL (ref 6.4–8.2)
RBC # BLD AUTO: 4.76 MILLION/UL (ref 3.81–5.12)
SODIUM SERPL-SCNC: 142 MMOL/L (ref 136–145)
TRIGL SERPL-MCNC: 72 MG/DL
VIT B12 SERPL-MCNC: 139 PG/ML (ref 100–900)
WBC # BLD AUTO: 6.97 THOUSAND/UL (ref 4.31–10.16)

## 2021-06-26 PROCEDURE — 84630 ASSAY OF ZINC: CPT

## 2021-06-26 PROCEDURE — 80053 COMPREHEN METABOLIC PANEL: CPT

## 2021-06-26 PROCEDURE — 82306 VITAMIN D 25 HYDROXY: CPT

## 2021-06-26 PROCEDURE — 82746 ASSAY OF FOLIC ACID SERUM: CPT

## 2021-06-26 PROCEDURE — 85025 COMPLETE CBC W/AUTO DIFF WBC: CPT

## 2021-06-26 PROCEDURE — 82607 VITAMIN B-12: CPT

## 2021-06-26 PROCEDURE — 80061 LIPID PANEL: CPT

## 2021-06-26 PROCEDURE — 82525 ASSAY OF COPPER: CPT

## 2021-06-30 LAB
COPPER SERPL-MCNC: 112 UG/DL (ref 80–158)
ZINC SERPL-MCNC: 58 UG/DL (ref 44–115)

## 2021-07-09 ENCOUNTER — OFFICE VISIT (OUTPATIENT)
Dept: INTERNAL MEDICINE CLINIC | Facility: CLINIC | Age: 51
End: 2021-07-09
Payer: COMMERCIAL

## 2021-07-09 VITALS
BODY MASS INDEX: 37.85 KG/M2 | HEIGHT: 70 IN | RESPIRATION RATE: 16 BRPM | OXYGEN SATURATION: 99 % | DIASTOLIC BLOOD PRESSURE: 78 MMHG | WEIGHT: 264.4 LBS | HEART RATE: 65 BPM | SYSTOLIC BLOOD PRESSURE: 126 MMHG

## 2021-07-09 DIAGNOSIS — Z13.220 SCREENING FOR HYPERLIPIDEMIA: ICD-10-CM

## 2021-07-09 DIAGNOSIS — Z12.31 ENCOUNTER FOR SCREENING MAMMOGRAM FOR MALIGNANT NEOPLASM OF BREAST: Primary | ICD-10-CM

## 2021-07-09 DIAGNOSIS — Z98.84 STATUS POST GASTRIC BYPASS FOR OBESITY: ICD-10-CM

## 2021-07-09 DIAGNOSIS — E61.1 IRON DEFICIENCY: ICD-10-CM

## 2021-07-09 DIAGNOSIS — E55.9 VITAMIN D DEFICIENCY: ICD-10-CM

## 2021-07-09 DIAGNOSIS — Z13.1 SCREENING FOR DIABETES MELLITUS: ICD-10-CM

## 2021-07-09 PROCEDURE — 99396 PREV VISIT EST AGE 40-64: CPT | Performed by: INTERNAL MEDICINE

## 2021-07-09 RX ORDER — MULTIVIT-MIN/IRON FUM/FOLIC AC 7.5 MG-4
1 TABLET ORAL DAILY
Qty: 90 TABLET | Refills: 3 | Status: SHIPPED | OUTPATIENT
Start: 2021-07-09 | End: 2022-07-15 | Stop reason: SDUPTHER

## 2021-07-09 RX ORDER — FERROUS SULFATE 325(65) MG
325 TABLET ORAL EVERY OTHER DAY
Qty: 90 TABLET | Refills: 1 | Status: SHIPPED | OUTPATIENT
Start: 2021-07-09 | End: 2021-07-09 | Stop reason: SDUPTHER

## 2021-07-09 RX ORDER — FERROUS SULFATE 325(65) MG
325 TABLET ORAL EVERY OTHER DAY
Qty: 90 TABLET | Refills: 1 | Status: SHIPPED | OUTPATIENT
Start: 2021-07-09 | End: 2022-07-15 | Stop reason: SDUPTHER

## 2021-07-09 RX ORDER — ASCORBATE CALCIUM 500 MG
500 TABLET ORAL DAILY
Qty: 90 TABLET | Refills: 3 | Status: SHIPPED | OUTPATIENT
Start: 2021-07-09 | End: 2022-07-15 | Stop reason: SDUPTHER

## 2021-07-09 RX ORDER — MULTIVIT-MIN/IRON FUM/FOLIC AC 7.5 MG-4
1 TABLET ORAL DAILY
Qty: 90 TABLET | Refills: 3 | Status: SHIPPED | OUTPATIENT
Start: 2021-07-09 | End: 2021-07-09 | Stop reason: SDUPTHER

## 2021-07-09 RX ORDER — LANOLIN ALCOHOL/MO/W.PET/CERES
1000 CREAM (GRAM) TOPICAL DAILY
Qty: 90 TABLET | Refills: 3 | Status: SHIPPED | OUTPATIENT
Start: 2021-07-09 | End: 2022-07-15 | Stop reason: SDUPTHER

## 2021-07-09 RX ORDER — ERGOCALCIFEROL 1.25 MG/1
50000 CAPSULE ORAL
Qty: 12 CAPSULE | Refills: 0 | Status: SHIPPED | OUTPATIENT
Start: 2021-07-09 | End: 2022-07-27 | Stop reason: SDUPTHER

## 2021-07-09 RX ORDER — ASCORBATE CALCIUM 500 MG
500 TABLET ORAL DAILY
Qty: 90 TABLET | Refills: 3 | Status: SHIPPED | OUTPATIENT
Start: 2021-07-09 | End: 2021-07-09 | Stop reason: SDUPTHER

## 2021-07-09 RX ORDER — ERGOCALCIFEROL 1.25 MG/1
50000 CAPSULE ORAL
Qty: 12 CAPSULE | Refills: 0 | Status: SHIPPED | OUTPATIENT
Start: 2021-07-09 | End: 2021-07-09 | Stop reason: SDUPTHER

## 2021-07-09 RX ORDER — LANOLIN ALCOHOL/MO/W.PET/CERES
1000 CREAM (GRAM) TOPICAL DAILY
Qty: 90 TABLET | Refills: 3 | Status: SHIPPED | OUTPATIENT
Start: 2021-07-09 | End: 2021-07-09 | Stop reason: SDUPTHER

## 2021-07-09 NOTE — PROGRESS NOTES
Assessment/Plan:    #Cholecystectomy  -previous lap farrah with umbilical hernia repair  -developed discharge from umbilicus and underwent removal  -now healed and doing well    #Anaphylaxis  -ate a peach and had throat and tongue swelling, treated with benadryl  -will prescribe epipen for use on hand if needed    #Basal Cell Ca  -removed from left forearm in past  -sees derm    #Diverticulitis  -previous flareup and has increased fiber and water    #Hearing Loss  -noted in left ear chronically  -defers intervention    #Lumbar Vertebral Body  -MRI with degenerative changes  -stable and asymptomatic     #Gastric Bypass  -monitor zinc, copper, folate, b12, thiamine, B6, PTH, vitamin D,   -continue vitamin C, D, multivitamin  -add on B12 supplementation    #HLD  -LDL 88 HDL 50  -well controlled    #Fatty Liver  -encouraged weight loss    #Iron Deficiency  -remains on iron supplementation    #Health Maintenance  -routine labs and followup 1 year  -mammogram pending  -covid vaccine up to date  -works for [employer]  -colonoscopy due 2025    BMI Counseling: Body mass index is 38 49 kg/m²  Discussed the patient's BMI with her  The BMI is above normal  Nutrition recommendations include decreasing soda and/or juice intake, moderation in carbohydrate intake, increasing intake of lean protein and reducing intake of saturated fat and trans fat  Exercise recommendations include moderate aerobic physical activity for 150 minutes/week, vigorous aerobic physical activity for 75 minutes/week and exercising 3-5 times per week  No problem-specific Assessment & Plan notes found for this encounter  Diagnoses and all orders for this visit:    Encounter for screening mammogram for malignant neoplasm of breast  -     Mammo screening bilateral w 3d & cad; Future  -     CBC and differential; Future  -     Comprehensive metabolic panel;  Future    Status post gastric bypass for obesity  -     Discontinue: ferrous sulfate 325 (65 Fe) mg tablet; Take 1 tablet (325 mg total) by mouth every other day  -     Discontinue: ergocalciferol (VITAMIN D2) 50,000 units; Take 1 capsule (50,000 Units total) by mouth every 28 days  -     Discontinue: Calcium Ascorbate (VITAMIN C) 500 mg tablet; Take 1 tablet (500 mg total) by mouth daily  -     Discontinue: vitamin B-12 (VITAMIN B-12) 1,000 mcg tablet; Take 1 tablet (1,000 mcg total) by mouth daily  -     Discontinue: Multiple Vitamins-Minerals (multivitamin with minerals) tablet; Take 1 tablet by mouth daily  -     CBC and differential; Future  -     Vitamin D 25 hydroxy; Future  -     Comprehensive metabolic panel; Future  -     Folate; Future  -     Vitamin B12; Future  -     Iron, TIBC and Ferritin Panel; Future  -     Copper Level; Future  -     Zinc; Future  -     PTH, intact; Future  -     Vitamin B1, whole blood; Future  -     Vitamin B6; Future  -     Calcium Ascorbate (VITAMIN C) 500 mg tablet; Take 1 tablet (500 mg total) by mouth daily  -     ergocalciferol (VITAMIN D2) 50,000 units; Take 1 capsule (50,000 Units total) by mouth every 28 days  -     ferrous sulfate 325 (65 Fe) mg tablet; Take 1 tablet (325 mg total) by mouth every other day  -     Multiple Vitamins-Minerals (multivitamin with minerals) tablet; Take 1 tablet by mouth daily  -     vitamin B-12 (VITAMIN B-12) 1,000 mcg tablet; Take 1 tablet (1,000 mcg total) by mouth daily    Iron deficiency  -     Discontinue: ferrous sulfate 325 (65 Fe) mg tablet; Take 1 tablet (325 mg total) by mouth every other day  -     CBC and differential; Future  -     Comprehensive metabolic panel; Future  -     Iron, TIBC and Ferritin Panel; Future  -     ferrous sulfate 325 (65 Fe) mg tablet; Take 1 tablet (325 mg total) by mouth every other day    Vitamin D deficiency  -     Discontinue: ergocalciferol (VITAMIN D2) 50,000 units;  Take 1 capsule (50,000 Units total) by mouth every 28 days  -     CBC and differential; Future  -     Vitamin D 25 hydroxy; Future  -     Comprehensive metabolic panel; Future  -     ergocalciferol (VITAMIN D2) 50,000 units; Take 1 capsule (50,000 Units total) by mouth every 28 days    Screening for diabetes mellitus  -     Hemoglobin A1C; Future    Screening for hyperlipidemia  -     Lipid Panel With Direct LDL; Future            Current Outpatient Medications:     Calcium Ascorbate (VITAMIN C) 500 mg tablet, Take 1 tablet (500 mg total) by mouth daily, Disp: 90 tablet, Rfl: 3    ergocalciferol (VITAMIN D2) 50,000 units, Take 1 capsule (50,000 Units total) by mouth every 28 days, Disp: 12 capsule, Rfl: 0    ferrous sulfate 325 (65 Fe) mg tablet, Take 1 tablet (325 mg total) by mouth every other day, Disp: 90 tablet, Rfl: 1    Multiple Vitamins-Minerals (multivitamin with minerals) tablet, Take 1 tablet by mouth daily, Disp: 90 tablet, Rfl: 3    vitamin B-12 (VITAMIN B-12) 1,000 mcg tablet, Take 1 tablet (1,000 mcg total) by mouth daily, Disp: 90 tablet, Rfl: 3    Subjective:      Patient ID: Trey Hinds is a 52 y o  female  HPI     Patient presents for a routine checkup  She underwent laparoscopic cholecystectomy as well as umbilical hernia repair with removal of the umbilicus due to infection and bloody discharge  She is recovered and doing well  Denies any pain at this time  Her LDL was 88 and HDL 50 currently well controlled  We checked her copper level which was 112 and zinc which was 58 and folate 12 5 with B12 139  She previously underwent gastric bypass surgery and we will start her on B12 supplementation  Her vitamin-D was stable at 32 4  A1c was slightly elevated at 5 8  Patient was encouraged to continue to diet and exercise  She has lost approximately 11 lb  Of note patient reports that she had an anaphylactic reaction to a peach  She states that she had wheezing with shortness of breath and throat swelling and took 2 Benadryl    She states that she will avoid peaches in the future however we will start her on an EpiPen  Patient is due for routine screening mammogram we gave her the referral for that  She is up-to-date on the COVID vaccine  She will return to care in 12 months with labs  The following portions of the patient's history were reviewed and updated as appropriate: allergies, current medications, past family history, past medical history, past social history, past surgical history and problem list     Review of Systems   Constitutional: Negative for activity change, appetite change, chills, fatigue and fever  HENT: Negative for congestion, ear pain, facial swelling, hearing loss, sore throat, tinnitus and trouble swallowing  Eyes: Negative for photophobia and visual disturbance  Respiratory: Negative for cough, shortness of breath and wheezing  Cardiovascular: Negative for chest pain and leg swelling  Gastrointestinal: Negative for abdominal distention, abdominal pain, blood in stool, constipation, diarrhea, nausea and vomiting  Genitourinary: Negative for difficulty urinating, dysuria and pelvic pain  Musculoskeletal: Negative for arthralgias, back pain, gait problem, joint swelling, myalgias, neck pain and neck stiffness  Skin: Negative for rash and wound  Neurological: Negative for dizziness, tremors, light-headedness and headaches  Objective:      /78   Pulse 65   Resp 16   Ht 5' 9 5" (1 765 m)   Wt 120 kg (264 lb 6 4 oz)   SpO2 99%   BMI 38 49 kg/m²          Physical Exam  Vitals reviewed  Constitutional:       Appearance: Normal appearance  She is well-developed  She is obese  HENT:      Head: Normocephalic and atraumatic  Right Ear: Tympanic membrane, ear canal and external ear normal  There is no impacted cerumen  Left Ear: Tympanic membrane, ear canal and external ear normal  There is no impacted cerumen        Nose: Nose normal    Eyes:      Conjunctiva/sclera: Conjunctivae normal       Pupils: Pupils are equal, round, and reactive to light    Neck:      Thyroid: No thyromegaly  Vascular: No JVD  Cardiovascular:      Rate and Rhythm: Normal rate and regular rhythm  Pulses: Normal pulses  Heart sounds: Normal heart sounds  No murmur heard  Pulmonary:      Effort: Pulmonary effort is normal  No respiratory distress  Breath sounds: Normal breath sounds  No stridor  No wheezing, rhonchi or rales  Abdominal:      General: Bowel sounds are normal  There is no distension  Palpations: Abdomen is soft  There is no mass  Tenderness: There is no abdominal tenderness  There is no right CVA tenderness, left CVA tenderness, guarding or rebound  Musculoskeletal:         General: No tenderness  Normal range of motion  Cervical back: Normal range of motion and neck supple  Right lower leg: No edema  Left lower leg: No edema  Lymphadenopathy:      Cervical: No cervical adenopathy  Skin:     General: Skin is warm and dry  Findings: No erythema or rash  Neurological:      Mental Status: She is alert and oriented to person, place, and time  Deep Tendon Reflexes: Reflexes are normal and symmetric  This note was completed in part utilizing [employer] direct voice recognition software  Grammatical errors, random word insertion, spelling mistakes, and incomplete sentences may be an occasional consequence of the system secondary to software limitations, ambient noise and hardware issues  At the time of dictation, efforts were made to edit, clarify and /or correct errors  Please read the chart carefully and recognize, using context, where substitutions have occurred  If you have any questions or concerns about the context, text or information contained within the body of this dictation, please contact myself, the provider, for further clarification

## 2021-11-01 ENCOUNTER — TELEMEDICINE (OUTPATIENT)
Dept: INTERNAL MEDICINE CLINIC | Facility: CLINIC | Age: 51
End: 2021-11-01
Payer: COMMERCIAL

## 2021-11-01 DIAGNOSIS — B34.9 VIRAL INFECTION, UNSPECIFIED: Primary | ICD-10-CM

## 2021-11-01 DIAGNOSIS — J01.00 ACUTE NON-RECURRENT MAXILLARY SINUSITIS: ICD-10-CM

## 2021-11-01 PROCEDURE — 99213 OFFICE O/P EST LOW 20 MIN: CPT | Performed by: INTERNAL MEDICINE

## 2021-11-01 PROCEDURE — 0241U HB NFCT DS VIR RESP RNA 4 TRGT: CPT | Performed by: INTERNAL MEDICINE

## 2021-11-01 RX ORDER — AMOXICILLIN AND CLAVULANATE POTASSIUM 875; 125 MG/1; MG/1
1 TABLET, FILM COATED ORAL EVERY 12 HOURS SCHEDULED
Qty: 14 TABLET | Refills: 0 | Status: SHIPPED | OUTPATIENT
Start: 2021-11-01 | End: 2021-11-08

## 2021-11-02 ENCOUNTER — TELEPHONE (OUTPATIENT)
Dept: INTERNAL MEDICINE CLINIC | Facility: CLINIC | Age: 51
End: 2021-11-02

## 2021-11-12 ENCOUNTER — IMMUNIZATIONS (OUTPATIENT)
Dept: FAMILY MEDICINE CLINIC | Facility: HOSPITAL | Age: 51
End: 2021-11-12

## 2021-11-12 DIAGNOSIS — Z23 ENCOUNTER FOR IMMUNIZATION: Primary | ICD-10-CM

## 2021-11-12 PROCEDURE — 91301 COVID-19 MODERNA VACC 0.5 ML: CPT

## 2021-11-12 PROCEDURE — 0011A COVID-19 MODERNA VACC 0.5 ML: CPT

## 2022-03-14 ENCOUNTER — TELEMEDICINE (OUTPATIENT)
Dept: INTERNAL MEDICINE CLINIC | Facility: CLINIC | Age: 52
End: 2022-03-14
Payer: COMMERCIAL

## 2022-03-14 DIAGNOSIS — U07.1 COVID-19: Primary | ICD-10-CM

## 2022-03-14 PROCEDURE — 99212 OFFICE O/P EST SF 10 MIN: CPT | Performed by: INTERNAL MEDICINE

## 2022-03-14 NOTE — LETTER
March 14, 2022    Patient: Javier Baptiste  YOB: 1970  Date of Last Encounter: 11/2/2021      To whom it may concern:     Javier Baptiste has tested positive for COVID-19 (Coronavirus)  She has been off from work since Friday 3/11/22   She may return to work on 3/16/22 however it would be preferred if she could work remotely this day to ensure that her symptoms resolve and that she is no longer contagious to anyone else      Sincerely,         Palma Poole, DO

## 2022-03-14 NOTE — PROGRESS NOTES
COVID-19 Outpatient Progress Note    Assessment/Plan:    Problem List Items Addressed This Visit     None      Visit Diagnoses     COVID-19    -  Primary        Patient presents for an acute visit  Reports that she started to develop symptoms of a head cold on Wednesday  She denies any sick contact exposure  She has been using Vicks with only mild relief  On Saturday she test positive for COVID  She has some night sweats and phlegm and a cough that is dry  We discussed paxlovid therapy however she defers this  She has remained quarantined at home  She will continue to quarantine until Wednesday when she can try to work remotely  Disposition:     Rapid antigen testing was performed and the result is POSITIVE for COVID-19  I recommended self-quarantine for 10 days and to watch for symptoms until 14 days after exposure  If patient were to develop symptoms, they should self isolate and call our office for further guidance  Patient has COVID-19 infection  Based off CDC guidelines, they were recommended to isolate for 5 days from the date of the positive test  If they remain asymptomatic, isolation may be ended followed by 5 days of wearing a mask when around othes to minimize risk of infecting others  If they have a fever, continue to stay home until fever resolves for at least 24 hours  I recommended continued isolation until at least 24 hours have passed since recovery defined as resolution of fever without the use of fever-reducing medications AND improvement in COVID symptoms AND 10 days have passed since onset of symptoms (or 10 days have passed since date of first positive viral diagnostic test for asymptomatic patients)  Discussed vitamin D, vitamin C, and/or zinc supplementation with patient  I have spent 10 minutes directly with the patient   Greater than 50% of this time was spent in counseling/coordination of care regarding: diagnostic results, prognosis, instructions for management, patient and family education, importance of treatment compliance and risk factor reductions  Encounter provider Imtiaz Gallagher DO    Provider located at [address] [place] [address]  [address] [place] [id number] [place] [id number] [facility]  [phone]    Recent Visits  No visits were found meeting these conditions  Showing recent visits within past 7 days and meeting all other requirements  Today's Visits  Date Type Provider Dept   03/14/22 Agnat[facility]e [facility]  [address] , [address] [facility]   Showing today's visits and meeting all other requirements  Future Appointments  No visits were found meeting these conditions  Showing future appointments within next 150 days and meeting all other requirements     This virtual check-in was done via telephone and she agrees to proceed  Patient agrees to participate in a virtual check in via telephone or video visit instead of presenting to the office to address urgent/immediate medical needs  Patient is aware this is a billable service  After connecting through Telephone, the patient was identified by name and date of birth  Abhiamador Jonathan was informed that this was a telemedicine visit and that the exam was being conducted confidentially over secure lines  My office door was closed  No one else was in the room  Bhakti Castillo acknowledged consent and understanding of privacy and security of the telemedicine visit  I informed the patient that I have reviewed her record in Epic and presented the opportunity for her to ask any questions regarding the visit today  The patient agreed to participate  It was my intent to perform this visit via video technology but the patient was not able to do a video connection so the visit was completed via audio telephone only          Verification of patient location:  Patient is located in the following state in which I hold an active license: [place]    Subjective:   Bhakti Castillo is a 54 y o  female who is concerned about COVID-19  Patient's symptoms include fever, fatigue, nasal congestion, cough and shortness of breath  Patient denies chills, malaise, rhinorrhea, sore throat, anosmia, loss of taste, chest tightness, abdominal pain, nausea, vomiting, diarrhea, myalgias and headaches         - Date of symptom onset: 3/9/2022      COVID-19 vaccination status: Fully vaccinated with booster    Exposure:   Contact with a person who is under investigation (PUI) for or who is positive for COVID-19 within the last 14 days?: No    Hospitalized recently for fever and/or lower respiratory symptoms?: No      Currently a healthcare worker that is involved in direct patient care?: No      Works in a special setting where the risk of COVID-19 transmission may be high? (this may include long-term care, correctional and penitentiary facilities; homeless shelters; assisted-living facilities and group homes ): No      Resident in a special setting where the risk of COVID-19 transmission may be high? (this may include long-term care, correctional and penitentiary facilities; homeless shelters; assisted-living facilities and group homes ): No      Lab Results   Component Value Date    SARSCOV2 Negative 11/01/2021     Past Medical History:   Diagnosis Date    Anemia     Basal cell carcinoma      Left forearm 4/01/2019    Diverticulitis of colon     Hypertension     Iron deficiency     Long term use of drug     resolved 11/27/15    PONV (postoperative nausea and vomiting)     Pt requests to be pre-medicated for N/V prior to sx    Shingles     Umbilical hernia      Past Surgical History:   Procedure Laterality Date    CHOLECYSTECTOMY  6/11/2021    GASTRIC BYPASS  03/29/2011    for morbid obesity    HERNIA REPAIR  6/11/2021    KNEE SURGERY      AL LAP,CHOLECYSTECTOMY N/A 5/21/2021    Procedure: LAPAROSCOPIC CHOLECYSTECTOMY;  Surgeon: Negrita Snider MD;  [facility];  [facility] [address] [place] [address] UMBILICAL CBBY,0+C/S,ITRBH N/A 5/21/2021    Procedure: UMBILICAL HERNIA REPAIR, REMOVAL OF UMBILICUS;  Surgeon: Ronaldo Gutiérrez MD;  [facility];  Service: General    TONSILLECTOMY       Current Outpatient Medications   Medication Sig Dispense Refill    Calcium Ascorbate (VITAMIN C) 500 mg tablet Take 1 tablet (500 mg total) by mouth daily 90 tablet 3    EPINEPHrine (EPIPEN) 0 3 mg/0 3 mL SOAJ Inject 0 3 mL (0 3 mg total) into a muscle once for 1 dose 2 each 1    ergocalciferol (VITAMIN D2) 50,000 units Take 1 capsule (50,000 Units total) by mouth every 28 days 12 capsule 0    ferrous sulfate 325 (65 Fe) mg tablet Take 1 tablet (325 mg total) by mouth every other day 90 tablet 1    Multiple Vitamins-Minerals (multivitamin with minerals) tablet Take 1 tablet by mouth daily 90 tablet 3    vitamin B-12 (VITAMIN B-12) 1,000 mcg tablet Take 1 tablet (1,000 mcg total) by mouth daily 90 tablet 3     No current facility-administered medications for this visit  Allergies   Allergen Reactions    Peach Flavor - Food Allergy Anaphylaxis and Facial Swelling       Review of Systems   Constitutional: Positive for fatigue and fever  Negative for chills  HENT: Positive for congestion  Negative for rhinorrhea and sore throat  Respiratory: Positive for cough and shortness of breath  Negative for chest tightness  Gastrointestinal: Negative for abdominal pain, diarrhea, nausea and vomiting  Musculoskeletal: Negative for myalgias  Neurological: Negative for headaches  Objective: There were no vitals filed for this visit  Physical Exam    VIRTUAL VISIT DISCLAIMER    Britni Ramirez verbally agrees to participate in [employer]   Pt is aware that [employer] could be limited without vital signs or the ability to perform a full hands-on physical Alexhitesh Desouza understands she or the provider may request at any time to terminate the video visit and request the patient to seek care or treatment in person

## 2022-04-04 ENCOUNTER — OFFICE VISIT (OUTPATIENT)
Dept: INTERNAL MEDICINE CLINIC | Facility: CLINIC | Age: 52
End: 2022-04-04
Payer: COMMERCIAL

## 2022-04-04 ENCOUNTER — TELEPHONE (OUTPATIENT)
Dept: INTERNAL MEDICINE CLINIC | Facility: CLINIC | Age: 52
End: 2022-04-04

## 2022-04-04 VITALS
SYSTOLIC BLOOD PRESSURE: 110 MMHG | BODY MASS INDEX: 37.51 KG/M2 | HEART RATE: 65 BPM | HEIGHT: 70 IN | TEMPERATURE: 97.9 F | WEIGHT: 262 LBS | DIASTOLIC BLOOD PRESSURE: 82 MMHG | OXYGEN SATURATION: 98 %

## 2022-04-04 DIAGNOSIS — R19.7 DIARRHEA OF PRESUMED INFECTIOUS ORIGIN: Primary | ICD-10-CM

## 2022-04-04 DIAGNOSIS — R11.2 NAUSEA AND VOMITING, UNSPECIFIED VOMITING TYPE: ICD-10-CM

## 2022-04-04 PROCEDURE — 99214 OFFICE O/P EST MOD 30 MIN: CPT | Performed by: INTERNAL MEDICINE

## 2022-04-04 RX ORDER — ONDANSETRON 4 MG/1
4 TABLET, FILM COATED ORAL EVERY 8 HOURS PRN
Qty: 60 TABLET | Refills: 0 | Status: SHIPPED | OUTPATIENT
Start: 2022-04-04 | End: 2022-07-15

## 2022-04-04 RX ORDER — ASCORBIC ACID 500 MG
TABLET ORAL
COMMUNITY
Start: 2022-03-21 | End: 2022-07-15 | Stop reason: SDUPTHER

## 2022-04-04 RX ORDER — LOPERAMIDE HYDROCHLORIDE 2 MG/1
2 CAPSULE ORAL 2 TIMES DAILY PRN
Qty: 30 CAPSULE | Refills: 0 | Status: SHIPPED | OUTPATIENT
Start: 2022-04-04 | End: 2022-07-15

## 2022-04-04 NOTE — PROGRESS NOTES
Assessment/Plan:    #Diarrhea  -present since Friday  -ate ham sandwich and drank coffee with half and half  -getting liquid diarrhea after 45 minutes post meals  -denies fever, abdominal pain  -denies NSAIDS, URI, eating anything unusual, sick contacts  -will obtain stool culture and c  Diff  -gave sheet for BRATS diet  -start zofran and imodium after stool samples submitted    No problem-specific Assessment & Plan notes found for this encounter  Diagnoses and all orders for this visit:    Diarrhea of presumed infectious origin  -     loperamide (IMODIUM) 2 mg capsule; Take 1 capsule (2 mg total) by mouth 2 (two) times a day as needed for diarrhea  -     ondansetron (ZOFRAN) 4 mg tablet; Take 1 tablet (4 mg total) by mouth every 8 (eight) hours as needed for nausea or vomiting  -     Stool culture; Future  -     Clostridium difficile toxin by PCR; Future    Nausea and vomiting, unspecified vomiting type  -     ondansetron (ZOFRAN) 4 mg tablet;  Take 1 tablet (4 mg total) by mouth every 8 (eight) hours as needed for nausea or vomiting    Other orders  -     C 500 500 MG tablet            Current Outpatient Medications:     C 500 500 MG tablet, , Disp: , Rfl:     Calcium Ascorbate (VITAMIN C) 500 mg tablet, Take 1 tablet (500 mg total) by mouth daily, Disp: 90 tablet, Rfl: 3    EPINEPHrine (EPIPEN) 0 3 mg/0 3 mL SOAJ, Inject 0 3 mL (0 3 mg total) into a muscle once for 1 dose, Disp: 2 each, Rfl: 1    ergocalciferol (VITAMIN D2) 50,000 units, Take 1 capsule (50,000 Units total) by mouth every 28 days, Disp: 12 capsule, Rfl: 0    ferrous sulfate 325 (65 Fe) mg tablet, Take 1 tablet (325 mg total) by mouth every other day, Disp: 90 tablet, Rfl: 1    loperamide (IMODIUM) 2 mg capsule, Take 1 capsule (2 mg total) by mouth 2 (two) times a day as needed for diarrhea, Disp: 30 capsule, Rfl: 0    Multiple Vitamins-Minerals (multivitamin with minerals) tablet, Take 1 tablet by mouth daily, Disp: 90 tablet, Rfl: 3    ondansetron (ZOFRAN) 4 mg tablet, Take 1 tablet (4 mg total) by mouth every 8 (eight) hours as needed for nausea or vomiting, Disp: 60 tablet, Rfl: 0    vitamin B-12 (VITAMIN B-12) 1,000 mcg tablet, Take 1 tablet (1,000 mcg total) by mouth daily, Disp: 90 tablet, Rfl: 3    Subjective:      Patient ID: Julianne Blair is a 54 y o  female  HPI     Patient presents for an acute visit  Reports that she has been having diarrhea since Friday after she ate a ham sandwich and drank coffee with half and half at work  She felt significant abdominal discomfort and had to have multiple bouts of diarrhea and nausea with vomiting  She denies any fevers or any upper respiratory symptoms  She denied any abdominal pain  she states that no one will sick at home  She did not eat anything unusual aside from that  She states that her  at home does not have similar symptoms  She was not exposed to anyone who was sick  She has not taken anything for relief  She states that any time she eats something she gets significant nausea and has to vomit and have a bowel movement about 45 minutes later  She states that she has been adhering to an all liquid diet without any improvement  She had a colonoscopy in 2018 that revealed diverticulosis  We will obtain a C diff and stool cultures  We will start her on Zofran him  She will only start on the Zofran Imodium after she has submitted the stool samples  The following portions of the patient's history were reviewed and updated as appropriate: allergies, current medications, past family history, past medical history, past social history, past surgical history and problem list     Review of Systems   Constitutional: Positive for appetite change and fatigue  Negative for activity change, chills, diaphoresis and fever  HENT: Negative for congestion, sore throat and trouble swallowing  Respiratory: Negative for cough and shortness of breath      Cardiovascular: Negative for chest pain and palpitations  Gastrointestinal: Positive for diarrhea, nausea and vomiting  Negative for abdominal distention, abdominal pain and constipation  Musculoskeletal: Negative for back pain and myalgias  Neurological: Negative for dizziness and headaches  Objective:      /82 (BP Location: Left arm, Patient Position: Sitting, Cuff Size: Large)   Pulse 65   Temp 97 9 °F (36 6 °C) (Oral)   Ht 5' 9 5" (1 765 m)   Wt 119 kg (262 lb)   SpO2 98%   BMI 38 14 kg/m²          Physical Exam  Constitutional:       General: She is not in acute distress  Appearance: She is well-developed  She is obese  She is ill-appearing  She is not diaphoretic  HENT:      Head: Normocephalic and atraumatic  Eyes:      Conjunctiva/sclera: Conjunctivae normal       Pupils: Pupils are equal, round, and reactive to light  Neck:      Vascular: No JVD  Trachea: No tracheal deviation  Cardiovascular:      Rate and Rhythm: Normal rate and regular rhythm  Pulses: Normal pulses  Heart sounds: Normal heart sounds  No murmur heard  No friction rub  No gallop  Pulmonary:      Effort: Pulmonary effort is normal  No respiratory distress  Breath sounds: Normal breath sounds  No stridor  No wheezing, rhonchi or rales  Abdominal:      General: Abdomen is flat  Bowel sounds are normal  There is no distension  Palpations: Abdomen is soft  There is no mass  Tenderness: There is no abdominal tenderness  There is no right CVA tenderness, left CVA tenderness, guarding or rebound  Hernia: No hernia is present  Musculoskeletal:         General: No tenderness or deformity  Normal range of motion  Cervical back: Normal range of motion and neck supple  Right lower leg: No edema  Left lower leg: No edema  Skin:     General: Skin is warm and dry  Findings: No erythema     Neurological:      Mental Status: She is alert and oriented to person, place, and time            This note was completed in part utilizing m-modal fluency direct voice recognition software  Grammatical errors, random word insertion, spelling mistakes, and incomplete sentences may be an occasional consequence of the system secondary to software limitations, ambient noise and hardware issues  At the time of dictation, efforts were made to edit, clarify and /or correct errors  Please read the chart carefully and recognize, using context, where substitutions have occurred  If you have any questions or concerns about the context, text or information contained within the body of this dictation, please contact myself, the provider, for further clarification

## 2022-04-04 NOTE — TELEPHONE ENCOUNTER
PT CALLED, SAID THAT THIS PAST Friday SHE STARTED THROWING UP AND HAD DIARRHEA  WAS THROWING UP ALL Friday  STILL HAS DIARRHEA, NO PAIN, NO TEMP, NO OTHER SYMPTOMS    SAID THAT SHE'S FINE UNLESS SHE EATS OR DRINKS ANYTHING, THEN THE DIARRHEA COMES BACK    SAID THAT HER URINE IS VERY DARK BECAUSE SHE'S ALSO GETTING NAUSEOUS WHEN SHE DRINKS   BETWEEN THE NAUSEA AND DIARRHEA, SHE'S NOT PUT MUCH IN HER SYSTEM     PT IS ASKING FOR YOU TO PRESCRIBE SOMETHING     HAS NOT TAKEN ANYTHING OVER THE COUNTER     PLEASE ADVISE

## 2022-05-31 ENCOUNTER — OFFICE VISIT (OUTPATIENT)
Dept: PODIATRY | Facility: CLINIC | Age: 52
End: 2022-05-31
Payer: COMMERCIAL

## 2022-05-31 VITALS
HEART RATE: 61 BPM | BODY MASS INDEX: 37.51 KG/M2 | SYSTOLIC BLOOD PRESSURE: 134 MMHG | DIASTOLIC BLOOD PRESSURE: 85 MMHG | WEIGHT: 262 LBS | HEIGHT: 70 IN

## 2022-05-31 DIAGNOSIS — G57.81 SURAL NEURITIS, RIGHT: Primary | ICD-10-CM

## 2022-05-31 DIAGNOSIS — M72.2 PLANTAR FASCIITIS: ICD-10-CM

## 2022-05-31 DIAGNOSIS — M79.671 RIGHT FOOT PAIN: ICD-10-CM

## 2022-05-31 PROCEDURE — 99203 OFFICE O/P NEW LOW 30 MIN: CPT

## 2022-05-31 PROCEDURE — 20550 NJX 1 TENDON SHEATH/LIGAMENT: CPT

## 2022-05-31 RX ORDER — LIDOCAINE HYDROCHLORIDE 10 MG/ML
2 INJECTION, SOLUTION EPIDURAL; INFILTRATION; INTRACAUDAL; PERINEURAL ONCE
Status: COMPLETED | OUTPATIENT
Start: 2022-05-31 | End: 2022-05-31

## 2022-05-31 RX ORDER — TRIAMCINOLONE ACETONIDE 40 MG/ML
20 INJECTION, SUSPENSION INTRA-ARTICULAR; INTRAMUSCULAR ONCE
Status: COMPLETED | OUTPATIENT
Start: 2022-05-31 | End: 2022-05-31

## 2022-05-31 RX ADMIN — TRIAMCINOLONE ACETONIDE 20 MG: 40 INJECTION, SUSPENSION INTRA-ARTICULAR; INTRAMUSCULAR at 14:40

## 2022-05-31 RX ADMIN — LIDOCAINE HYDROCHLORIDE 2 ML: 10 INJECTION, SOLUTION EPIDURAL; INFILTRATION; INTRACAUDAL; PERINEURAL at 14:40

## 2022-05-31 NOTE — PROGRESS NOTES
PATIENT:  Shazia Qiu  1970       ASSESSMENT:     1  Sural neuritis, right     2  Plantar fasciitis  lidocaine (PF) (XYLOCAINE-MPF) 1 % injection 2 mL    triamcinolone acetonide (KENALOG-40) 40 mg/mL injection 20 mg    Foot injection   3  Right foot pain               PLAN:  1  Reviewed medical records and labs  Patient was counseled and educated on the condition and the diagnosis  2  She has lateral right heel pain  Possible plantar fasciitis of lateral tubercle vs sural neuritis  The diagnosis, treatment options and prognosis were discussed with the patient  3  Discussed options and would try an injection to right plantar lateral heel  See procedure  4  Instructed supportive care, home exercise, icing, and proper footwear/ arch support  Discussed possible images depending on the progress  5  Possible sural nerve block depending on the progress  6  Patient will return in 6 weeks for re-evaluation  Foot injection     Date/Time 5/31/2022 3:10 PM     Performed by  Francisca Schafer DPM     Authorized by Francisca Schafer DPM      Universal Protocol   Consent: Verbal consent obtained  Risks and benefits: risks, benefits and alternatives were discussed  Consent given by: patient  Time out: Immediately prior to procedure a "time out" was called to verify the correct patient, procedure, equipment, support staff and site/side marked as required  Timeout called at: 5/31/2022 3:11 PM   Patient understanding: patient states understanding of the procedure being performed  Site marked: the operative site was marked  Patient identity confirmed: verbally with patient       Procedure Details   Procedure Notes:   Treatment options were discussed and the patient wished to proceed with an injection  A trigger point injection was given to plantar lateral heel using 0 5cc Kenolog 40 and 2cc 1% Lidocaine pl  Instructed supportive care, icing, and resting     Patient tolerance: Patient tolerated the procedure well with no immediate complications               Subjective:       HPI  The patient presents with chief complaint of right foot pain since September last year  The symptoms presents around plantar lateral right heel with shooting / throbbing sensation  Pain level is about 10 out of 10 at the end of the day  The symptoms have been worse since the onset  She has mild post-static dyskinesia  Pain also increases with walking and standing  She also has some pins and needles  The patient does not recall any injury  The patient tried OTC meds, and different shoes without relieving the pain  Denied any swelling  No significant weakness  The following portions of the patient's history were reviewed and updated as appropriate: allergies, current medications, past family history, past medical history, past social history, past surgical history and problem list   All pertinent labs and images were reviewed        Past Medical History  Past Medical History:   Diagnosis Date    Anemia     Basal cell carcinoma      Left forearm 4/01/2019    Diverticulitis of colon     Hypertension     Iron deficiency     Long term use of drug     resolved 11/27/15    PONV (postoperative nausea and vomiting)     Pt requests to be pre-medicated for N/V prior to sx    Shingles     Umbilical hernia        Past Surgical History  Past Surgical History:   Procedure Laterality Date    CHOLECYSTECTOMY  6/11/2021    GASTRIC BYPASS  03/29/2011    for morbid obesity    HERNIA REPAIR  6/11/2021    KNEE SURGERY      OH LAP,CHOLECYSTECTOMY N/A 5/21/2021    Procedure: LAPAROSCOPIC CHOLECYSTECTOMY;  Surgeon: Tracy Desai MD;  [facility];  Service: General    OH REPAIR UMBILICAL XDDC,9+R/U,XEMCP N/A 5/21/2021    Procedure: UMBILICAL HERNIA REPAIR, REMOVAL OF UMBILICUS;  Surgeon: Tracy Desai MD;  [facility];  Service: General    TONSILLECTOMY          Allergies:  Peach flavor - food allergy    Medications:  Current Outpatient Medications   Medication Sig Dispense Refill    C 500 500 MG tablet       Calcium Ascorbate (VITAMIN C) 500 mg tablet Take 1 tablet (500 mg total) by mouth daily 90 tablet 3    ergocalciferol (VITAMIN D2) 50,000 units Take 1 capsule (50,000 Units total) by mouth every 28 days 12 capsule 0    ferrous sulfate 325 (65 Fe) mg tablet Take 1 tablet (325 mg total) by mouth every other day 90 tablet 1    loperamide (IMODIUM) 2 mg capsule Take 1 capsule (2 mg total) by mouth 2 (two) times a day as needed for diarrhea 30 capsule 0    Multiple Vitamins-Minerals (multivitamin with minerals) tablet Take 1 tablet by mouth daily 90 tablet 3    ondansetron (ZOFRAN) 4 mg tablet Take 1 tablet (4 mg total) by mouth every 8 (eight) hours as needed for nausea or vomiting 60 tablet 0    vitamin B-12 (VITAMIN B-12) 1,000 mcg tablet Take 1 tablet (1,000 mcg total) by mouth daily 90 tablet 3    EPINEPHrine (EPIPEN) 0 3 mg/0 3 mL SOAJ Inject 0 3 mL (0 3 mg total) into a muscle once for 1 dose 2 each 1     No current facility-administered medications for this visit  Social History:  Social History     Socioeconomic History    Marital status: /Civil Union     Spouse name: None    Number of children: None    Years of education: None    Highest education level: None   Occupational History    None   Tobacco Use    Smoking status: Never Smoker    Smokeless tobacco: Never Used   Substance and Sexual Activity    Alcohol use: No     Comment: stopped drinking alcohol    Drug use: No    Sexual activity: Yes     Partners: Male     Birth control/protection: I U D     Other Topics Concern    None   Social History Narrative    coffee     Social Determinants of Health     Financial Resource Strain: Not on file   Food Insecurity: Not on file   Transportation Needs: Not on file   Physical Activity: Not on file   Stress: Not on file   Social Connections: Not on file   Intimate Partner Violence: Not on file   Housing Stability: Not on file          Review of Systems   Constitutional: Negative for appetite change, chills and fever  HENT: Negative for sore throat  Respiratory: Negative for cough and shortness of breath  Cardiovascular: Negative for chest pain and leg swelling  Gastrointestinal: Negative for diarrhea, nausea and vomiting  Musculoskeletal: Negative for joint swelling  Skin: Negative for rash and wound  Allergic/Immunologic: Negative for immunocompromised state  Neurological: Negative for weakness and numbness  Hematological: Negative  Psychiatric/Behavioral: Negative for behavioral problems and confusion  Objective:      /85   Pulse 61   Ht 5' 9 5" (1 765 m) Comment: verbal  Wt 119 kg (262 lb)   BMI 38 14 kg/m²          Physical Exam  Vitals reviewed  Constitutional:       General: She is not in acute distress  Appearance: She is not toxic-appearing or diaphoretic  HENT:      Head: Normocephalic and atraumatic  Eyes:      Extraocular Movements: Extraocular movements intact  Cardiovascular:      Rate and Rhythm: Normal rate and regular rhythm  Pulses: Normal pulses  Dorsalis pedis pulses are 2+ on the right side and 2+ on the left side  Posterior tibial pulses are 2+ on the right side and 2+ on the left side  Pulmonary:      Effort: Pulmonary effort is normal  No respiratory distress  Musculoskeletal:         General: Tenderness and deformity present  No swelling or signs of injury  Cervical back: Normal range of motion and neck supple  Right lower leg: No edema  Left lower leg: No edema  Right foot: No foot drop  Left foot: No foot drop  Comments: Focal tenderness right plantar lateral heel  No edema  She also has tenderness and Tinel sign along right sural nerve / lateral calcaneal nerve right lateral heel / ankle  Hammertoe presents  Skin:     General: Skin is warm  Capillary Refill: Capillary refill takes less than 2 seconds  Coloration: Skin is not cyanotic or mottled  Findings: No abscess, ecchymosis, erythema or wound  Nails: There is no clubbing  Neurological:      General: No focal deficit present  Mental Status: She is alert and oriented to person, place, and time  Cranial Nerves: No cranial nerve deficit  Sensory: No sensory deficit  Motor: No weakness  Coordination: Coordination normal       Gait: Gait normal    Psychiatric:         Mood and Affect: Mood normal          Behavior: Behavior normal          Thought Content:  Thought content normal          Judgment: Judgment normal

## 2022-07-10 ENCOUNTER — APPOINTMENT (OUTPATIENT)
Dept: LAB | Facility: CLINIC | Age: 52
End: 2022-07-10

## 2022-07-10 DIAGNOSIS — Z00.8 HEALTH EXAMINATION IN POPULATION SURVEY: ICD-10-CM

## 2022-07-10 LAB
CHOLEST SERPL-MCNC: 196 MG/DL
EST. AVERAGE GLUCOSE BLD GHB EST-MCNC: 117 MG/DL
HBA1C MFR BLD: 5.7 %
HDLC SERPL-MCNC: 57 MG/DL
LDLC SERPL CALC-MCNC: 115 MG/DL (ref 0–100)
NONHDLC SERPL-MCNC: 139 MG/DL
TRIGL SERPL-MCNC: 121 MG/DL

## 2022-07-10 PROCEDURE — 36415 COLL VENOUS BLD VENIPUNCTURE: CPT

## 2022-07-10 PROCEDURE — 80061 LIPID PANEL: CPT

## 2022-07-10 PROCEDURE — 83036 HEMOGLOBIN GLYCOSYLATED A1C: CPT

## 2022-07-15 ENCOUNTER — OFFICE VISIT (OUTPATIENT)
Dept: INTERNAL MEDICINE CLINIC | Facility: CLINIC | Age: 52
End: 2022-07-15
Payer: COMMERCIAL

## 2022-07-15 VITALS
SYSTOLIC BLOOD PRESSURE: 128 MMHG | BODY MASS INDEX: 39.94 KG/M2 | WEIGHT: 274.4 LBS | DIASTOLIC BLOOD PRESSURE: 70 MMHG | HEART RATE: 72 BPM

## 2022-07-15 DIAGNOSIS — R73.03 PRE-DIABETES: ICD-10-CM

## 2022-07-15 DIAGNOSIS — Z98.84 STATUS POST GASTRIC BYPASS FOR OBESITY: ICD-10-CM

## 2022-07-15 DIAGNOSIS — S80.212A ABRASION, LEFT KNEE, INITIAL ENCOUNTER: ICD-10-CM

## 2022-07-15 DIAGNOSIS — E61.1 IRON DEFICIENCY: ICD-10-CM

## 2022-07-15 DIAGNOSIS — Z12.31 SCREENING MAMMOGRAM FOR BREAST CANCER: ICD-10-CM

## 2022-07-15 DIAGNOSIS — Z90.49 HISTORY OF LAPAROSCOPIC CHOLECYSTECTOMY: Primary | ICD-10-CM

## 2022-07-15 DIAGNOSIS — Z01.419 WOMEN'S ANNUAL ROUTINE GYNECOLOGICAL EXAMINATION: ICD-10-CM

## 2022-07-15 DIAGNOSIS — E78.2 MODERATE MIXED HYPERLIPIDEMIA NOT REQUIRING STATIN THERAPY: ICD-10-CM

## 2022-07-15 PROCEDURE — 99396 PREV VISIT EST AGE 40-64: CPT | Performed by: INTERNAL MEDICINE

## 2022-07-15 PROCEDURE — 90715 TDAP VACCINE 7 YRS/> IM: CPT

## 2022-07-15 PROCEDURE — 90471 IMMUNIZATION ADMIN: CPT

## 2022-07-15 RX ORDER — FERROUS SULFATE 325(65) MG
325 TABLET ORAL EVERY OTHER DAY
Qty: 90 TABLET | Refills: 1 | Status: SHIPPED | OUTPATIENT
Start: 2022-07-15

## 2022-07-15 RX ORDER — LANOLIN ALCOHOL/MO/W.PET/CERES
1000 CREAM (GRAM) TOPICAL DAILY
Qty: 90 TABLET | Refills: 3 | Status: SHIPPED | OUTPATIENT
Start: 2022-07-15

## 2022-07-15 RX ORDER — ASCORBATE CALCIUM 500 MG
500 TABLET ORAL DAILY
Qty: 90 TABLET | Refills: 3 | Status: SHIPPED | OUTPATIENT
Start: 2022-07-15

## 2022-07-15 RX ORDER — MULTIVIT-MIN/IRON FUM/FOLIC AC 7.5 MG-4
1 TABLET ORAL DAILY
Qty: 90 TABLET | Refills: 3 | Status: SHIPPED | OUTPATIENT
Start: 2022-07-15

## 2022-07-15 RX ORDER — ASCORBIC ACID 500 MG
500 TABLET ORAL DAILY
Qty: 90 TABLET | Refills: 3 | Status: SHIPPED | OUTPATIENT
Start: 2022-07-15

## 2022-07-15 NOTE — PROGRESS NOTES
Assessment/Plan:    #Diarrhea  -present since Friday  -ate ham sandwich and drank coffee with half and half  -getting liquid diarrhea after 45 minutes post meals  -denies fever, abdominal pain  -denies NSAIDS, URI, eating anything unusual, sick contacts  -did not obtain stool culture or c  diff  -symptoms resolved with brats diet, zofran and imodium    #COVID  -infection in March 2022 and now resolved    #Knee Laceration  -on left knee  -will provide TDAP    #Cholecystectomy  -previous lap farrah with umbilical hernia repair  -developed discharge from umbilicus and underwent removal  -now healed and doing well  -no longer having loose stools or intestinal issues    #Right Lateral Heel Injection  -secondary to impingement on lateral sural nerve  -injected with podiatry 5/2022 and now resolved    #Anaphylaxis  -ate a peach and had throat and tongue swelling, treated with benadryl  -has epipen in case of flares    #Basal Cell Ca  -removed from left forearm in past  -sees derm yearly    #Diverticulitis  -previous flareup and has increased fiber and water    #Hearing Loss  -noted in left ear chronically  -defers intervention    #Lumbar Vertebral Body  -MRI with degenerative changes  -stable and asymptomatic      #Gastric Bypass  -monitor zinc, copper, folate, b12, thiamine, B6, PTH, vitamin D,   -continue vitamin C, D, multivitamin  -add on B12 supplementation     #HLD  - HDL 57  -elevated and encouraged to diet and exericse    #Prediabetes  -a1c at 5 7 and encouraged cutting back on carbohydrates    #Fatty Liver  -encouraged weight loss    #Iron Deficiency  -remains on iron supplementation    #Health Maintenance  -routine labs and followup 1 year  -mammogram pending  -covid vaccine up to date  -encouraged to see gynecology  -works for [employer]  -colonoscopy due 2025  -TDAP today    No problem-specific Assessment & Plan notes found for this encounter         Diagnoses and all orders for this visit: History of laparoscopic cholecystectomy  -     CBC and differential  -     Comprehensive metabolic panel    Status post gastric bypass for obesity  -     vitamin B-12 (VITAMIN B-12) 1,000 mcg tablet; Take 1 tablet (1,000 mcg total) by mouth daily  -     Multiple Vitamins-Minerals (multivitamin with minerals) tablet; Take 1 tablet by mouth daily  -     ferrous sulfate 325 (65 Fe) mg tablet; Take 1 tablet (325 mg total) by mouth every other day  -     Calcium Ascorbate (VITAMIN C) 500 mg tablet; Take 1 tablet (500 mg total) by mouth daily  -     C 500 500 MG tablet; Take 1 tablet (500 mg total) by mouth daily  -     CBC and differential  -     Comprehensive metabolic panel  -     Vitamin D 25 hydroxy  -     Vitamin B12  -     Folate  -     Copper Level  -     Zinc  -     Vitamin B1, whole blood  -     Vitamin B6    Iron deficiency  -     ferrous sulfate 325 (65 Fe) mg tablet; Take 1 tablet (325 mg total) by mouth every other day  -     CBC and differential  -     Comprehensive metabolic panel  -     Iron, TIBC and Ferritin Panel; Future    Pre-diabetes  -     CBC and differential  -     Comprehensive metabolic panel  -     Hemoglobin A1C    Moderate mixed hyperlipidemia not requiring statin therapy  -     CBC and differential  -     Comprehensive metabolic panel  -     Lipid Panel with Direct LDL reflex    Screening mammogram for breast cancer  -     Mammo screening bilateral w 3d & cad; Future  -     CBC and differential  -     Comprehensive metabolic panel    Women's annual routine gynecological examination  -     Ambulatory Referral to Gynecology;  Future  -     CBC and differential  -     Comprehensive metabolic panel    Abrasion, left knee, initial encounter  -     TDAP VACCINE GREATER THAN OR EQUAL TO 6YO IM  -     CBC and differential  -     Comprehensive metabolic panel            Current Outpatient Medications:     C 500 500 MG tablet, Take 1 tablet (500 mg total) by mouth daily, Disp: 90 tablet, Rfl: 3    Calcium Ascorbate (VITAMIN C) 500 mg tablet, Take 1 tablet (500 mg total) by mouth daily, Disp: 90 tablet, Rfl: 3    ferrous sulfate 325 (65 Fe) mg tablet, Take 1 tablet (325 mg total) by mouth every other day, Disp: 90 tablet, Rfl: 1    Multiple Vitamins-Minerals (multivitamin with minerals) tablet, Take 1 tablet by mouth daily, Disp: 90 tablet, Rfl: 3    vitamin B-12 (VITAMIN B-12) 1,000 mcg tablet, Take 1 tablet (1,000 mcg total) by mouth daily, Disp: 90 tablet, Rfl: 3    EPINEPHrine (EPIPEN) 0 3 mg/0 3 mL SOAJ, Inject 0 3 mL (0 3 mg total) into a muscle once for 1 dose, Disp: 2 each, Rfl: 1    ergocalciferol (VITAMIN D2) 50,000 units, Take 1 capsule (50,000 Units total) by mouth every 28 days, Disp: 12 capsule, Rfl: 0    Subjective:      Patient ID: Ramiro Bustillos is a 50 y o  female  HPI     Patient presents for routine checkup  Denies any recent hospitalizations  She underwent right lateral heel injection over the sural nerve on 05/31/2022 and has since recovered without any problems  She had COVID in March 2022 and also recovered with symptomatic relief  She has a history of a gastric bypass surgery and we will monitor her vitamin and mineral levels  She remains on vitamin-C, D, multivitamin, B12  She has a left knee laceration and we will provide her with the tetanus vaccine  She also sees Dermatology on a yearly basis  She had previous basal cell removed on neuro left forearm in the past   Her most recent set of labs reveal  and HDL 57  Both are elevated as well as her weight  Encouraged her to diet and exercise  Her A1c is at 5 7  She will cut back on carbohydrates  She previously had a cholecystostomy and had diarrhea however this has resolved  She is no longer taking Imodium  She will return to care in 1 year  She is due for mammogram and to follow-up with gynecology and we gave her referrals for this      The following portions of the patient's history were reviewed and updated as appropriate: allergies, current medications, past family history, past medical history, past social history, past surgical history and problem list     Review of Systems   Constitutional: Negative for activity change, appetite change, chills, fatigue and fever  HENT: Negative for congestion, ear pain, facial swelling, hearing loss, sore throat, tinnitus and trouble swallowing  Eyes: Negative for photophobia and visual disturbance  Respiratory: Negative for cough, shortness of breath and wheezing  Cardiovascular: Negative for chest pain and leg swelling  Gastrointestinal: Negative for abdominal distention, abdominal pain, blood in stool, nausea and vomiting  Genitourinary: Negative for difficulty urinating, dysuria and pelvic pain  Musculoskeletal: Negative for arthralgias, back pain, gait problem, joint swelling, myalgias, neck pain and neck stiffness  Skin: Positive for wound (left knee cut)  Negative for rash  Neurological: Negative for dizziness, tremors, light-headedness and headaches  Objective:      /70   Pulse 72   Wt 124 kg (274 lb 6 4 oz)   BMI 39 94 kg/m²          Physical Exam  Vitals reviewed  Constitutional:       Appearance: Normal appearance  She is well-developed  She is obese  HENT:      Head: Normocephalic and atraumatic  Right Ear: Tympanic membrane, ear canal and external ear normal  There is no impacted cerumen  Left Ear: Tympanic membrane, ear canal and external ear normal  There is no impacted cerumen  Nose: Nose normal    Eyes:      Conjunctiva/sclera: Conjunctivae normal       Pupils: Pupils are equal, round, and reactive to light  Neck:      Thyroid: No thyromegaly  Vascular: No JVD  Cardiovascular:      Rate and Rhythm: Normal rate and regular rhythm  Pulses: Normal pulses  Heart sounds: Normal heart sounds  No murmur heard    Pulmonary:      Effort: Pulmonary effort is normal  No respiratory distress  Breath sounds: Normal breath sounds  No stridor  No wheezing, rhonchi or rales  Abdominal:      General: Bowel sounds are normal  There is no distension  Palpations: Abdomen is soft  There is no mass  Tenderness: There is no abdominal tenderness  There is no guarding or rebound  Musculoskeletal:         General: No tenderness  Normal range of motion  Cervical back: Normal range of motion and neck supple  Right lower leg: No edema  Left lower leg: No edema  Lymphadenopathy:      Cervical: No cervical adenopathy  Skin:     General: Skin is warm  Findings: Lesion (laceration to left patella) present  No erythema or rash  Neurological:      Mental Status: She is alert and oriented to person, place, and time  Deep Tendon Reflexes: Reflexes are normal and symmetric  This note was completed in part utilizing [employer] direct voice recognition software  Grammatical errors, random word insertion, spelling mistakes, and incomplete sentences may be an occasional consequence of the system secondary to software limitations, ambient noise and hardware issues  At the time of dictation, efforts were made to edit, clarify and /or correct errors  Please read the chart carefully and recognize, using context, where substitutions have occurred  If you have any questions or concerns about the context, text or information contained within the body of this dictation, please contact myself, the provider, for further clarification

## 2022-07-27 DIAGNOSIS — Z98.84 STATUS POST GASTRIC BYPASS FOR OBESITY: ICD-10-CM

## 2022-07-27 DIAGNOSIS — E55.9 VITAMIN D DEFICIENCY: ICD-10-CM

## 2022-07-28 RX ORDER — ERGOCALCIFEROL 1.25 MG/1
50000 CAPSULE ORAL
Qty: 12 CAPSULE | Refills: 0 | Status: SHIPPED | OUTPATIENT
Start: 2022-07-28

## 2022-09-24 ENCOUNTER — HOSPITAL ENCOUNTER (OUTPATIENT)
Dept: RADIOLOGY | Age: 52
Discharge: HOME | End: 2022-09-24
Payer: COMMERCIAL

## 2022-09-24 VITALS — BODY MASS INDEX: 39.22 KG/M2 | HEIGHT: 70 IN | WEIGHT: 274 LBS

## 2022-09-24 DIAGNOSIS — Z12.31 SCREENING MAMMOGRAM FOR BREAST CANCER: ICD-10-CM

## 2022-09-24 PROCEDURE — 77063 BREAST TOMOSYNTHESIS BI: CPT

## 2022-09-24 PROCEDURE — 77067 SCR MAMMO BI INCL CAD: CPT

## 2022-12-07 ENCOUNTER — ANNUAL EXAM (OUTPATIENT)
Dept: OBGYN CLINIC | Facility: CLINIC | Age: 52
End: 2022-12-07

## 2022-12-07 VITALS
SYSTOLIC BLOOD PRESSURE: 130 MMHG | BODY MASS INDEX: 39.65 KG/M2 | WEIGHT: 277 LBS | DIASTOLIC BLOOD PRESSURE: 74 MMHG | HEIGHT: 70 IN

## 2022-12-07 DIAGNOSIS — Z01.419 WOMEN'S ANNUAL ROUTINE GYNECOLOGICAL EXAMINATION: ICD-10-CM

## 2022-12-07 DIAGNOSIS — Z12.31 ENCOUNTER FOR SCREENING MAMMOGRAM FOR MALIGNANT NEOPLASM OF BREAST: ICD-10-CM

## 2022-12-07 DIAGNOSIS — Z01.419 ENCOUNTER FOR ANNUAL ROUTINE GYNECOLOGICAL EXAMINATION: Primary | ICD-10-CM

## 2022-12-07 DIAGNOSIS — N91.2 AMENORRHEA: ICD-10-CM

## 2022-12-07 NOTE — PROGRESS NOTES
Assessment/Plan:  Pap smear done as well as annual   Encourage self breast examination as well as calcium supplementation  Continue annual mammogram   Reviewed colon cancer screening, up-to-date  Reviewed perimenopausal/menopausal symptoms  Reviewed Mirena IUD (inserted 10/2016), effective for 8 consecutive years  Recommend check [address] level to assess menopausal status  All questions answered  Return to office in 1 year or as needed  No problem-specific Assessment & Plan notes found for this encounter  Diagnoses and all orders for this visit:    Encounter for annual routine gynecological examination  -     Liquid-based pap, screening    Encounter for screening mammogram for malignant neoplasm of breast  -     Mammo screening bilateral w 3d & cad; Future    Women's annual routine gynecological examination  -     Ambulatory Referral to Gynecology    Amenorrhea  -     Follicle stimulating hormone    Other orders  -     Levonorgestrel (MIRENA) 20 MCG/DAY IUD; 1 each by Intrauterine route once          Subjective:      Patient ID: Red Murphy is a 50 y o  female  HPI     This is a pleasant 53-year-old female G0 who presents for her GYN exam   Last GYN exam was approximately 6 years ago  She had her second IUD (Mirena) inserted 10/2016  She has been amenorrheic since she has been using Mirena IUD  She denies any hot flashes or night sweats  No changes in bowel or bladder function  She is sexually active and has been in a monogamous relationship with her  for over 27 years  Pap smears have been normal     mirena IUD 11/2011, 10/2016    Colonoscopy 2018, normal with follow-up in 10 years    She continues to follow-up with her family physician on a regular basis  Patient went through menarche at age 7  Long history of heavy menses up until IUD was inserted      The following portions of the patient's history were reviewed and updated as appropriate: allergies, current medications, past family history, past medical history, past social history, past surgical history and problem list     Review of Systems   Constitutional: Negative for fatigue, fever and unexpected weight change  Respiratory: Negative for cough, chest tightness, shortness of breath and wheezing  Cardiovascular: Negative  Negative for chest pain and palpitations  Gastrointestinal: Negative  Negative for abdominal distention, abdominal pain, blood in stool, constipation, diarrhea, nausea and vomiting  Genitourinary: Negative  Negative for difficulty urinating, dyspareunia, dysuria, flank pain, frequency, genital sores, hematuria, pelvic pain, urgency, vaginal bleeding, vaginal discharge and vaginal pain  Skin: Negative for rash  Objective:      /74   Ht 5' 9 5" (1 765 m)   Wt 126 kg (277 lb)   BMI 40 32 kg/m²          Physical Exam  Constitutional:       Appearance: Normal appearance  She is well-developed  Cardiovascular:      Rate and Rhythm: Normal rate and regular rhythm  Pulmonary:      Effort: Pulmonary effort is normal       Breath sounds: Normal breath sounds  Chest:   Breasts:     Right: No inverted nipple, mass, nipple discharge, skin change or tenderness  Left: No inverted nipple, mass, nipple discharge, skin change or tenderness  Abdominal:      General: Bowel sounds are normal  There is no distension  Palpations: Abdomen is soft  Tenderness: There is no abdominal tenderness  There is no guarding or rebound  Genitourinary:     Labia:         Right: No rash, tenderness or lesion  Left: No rash, tenderness or lesion  Vagina: Normal  No signs of injury  No vaginal discharge or tenderness  Cervix: No cervical motion tenderness, discharge, friability, lesion, erythema or cervical bleeding  Uterus: Not enlarged and not tender  Adnexa:         Right: No mass, tenderness or fullness  Left: No mass, tenderness or fullness       Neurological: Mental Status: She is alert and oriented to person, place, and time  Psychiatric:         Behavior: Behavior normal        Abdomen is soft nontender, umbilicus surgically absent  The vagina is well estrogenized  Cervix is small nulliparous  The IUD string is visualized today

## 2022-12-09 LAB
HPV HR 12 DNA CVX QL NAA+PROBE: NEGATIVE
HPV16 DNA CVX QL NAA+PROBE: NEGATIVE
HPV18 DNA CVX QL NAA+PROBE: NEGATIVE

## 2022-12-17 LAB
LAB AP GYN PRIMARY INTERPRETATION: NORMAL
Lab: NORMAL

## 2023-07-09 ENCOUNTER — APPOINTMENT (OUTPATIENT)
Dept: LAB | Facility: CLINIC | Age: 53
End: 2023-07-09
Payer: COMMERCIAL

## 2023-07-09 DIAGNOSIS — Z00.8 HEALTH EXAMINATION IN POPULATION SURVEY: ICD-10-CM

## 2023-07-09 DIAGNOSIS — E61.1 IRON DEFICIENCY: ICD-10-CM

## 2023-07-09 DIAGNOSIS — E61.1 IRON DEFICIENCY: Primary | ICD-10-CM

## 2023-07-09 LAB
25(OH)D3 SERPL-MCNC: 23.6 NG/ML (ref 30–100)
ALBUMIN SERPL BCP-MCNC: 4 G/DL (ref 3.5–5)
ALP SERPL-CCNC: 98 U/L (ref 34–104)
ALT SERPL W P-5'-P-CCNC: 13 U/L (ref 7–52)
ANION GAP SERPL CALCULATED.3IONS-SCNC: 6 MMOL/L
AST SERPL W P-5'-P-CCNC: 14 U/L (ref 13–39)
BASOPHILS # BLD AUTO: 0.06 THOUSANDS/ÂΜL (ref 0–0.1)
BASOPHILS NFR BLD AUTO: 1 % (ref 0–1)
BILIRUB SERPL-MCNC: 0.61 MG/DL (ref 0.2–1)
BUN SERPL-MCNC: 11 MG/DL (ref 5–25)
CALCIUM SERPL-MCNC: 9.3 MG/DL (ref 8.4–10.2)
CHLORIDE SERPL-SCNC: 105 MMOL/L (ref 96–108)
CHOLEST SERPL-MCNC: 176 MG/DL
CO2 SERPL-SCNC: 27 MMOL/L (ref 21–32)
CREAT SERPL-MCNC: 0.63 MG/DL (ref 0.6–1.3)
EOSINOPHIL # BLD AUTO: 0.1 THOUSAND/ÂΜL (ref 0–0.61)
EOSINOPHIL NFR BLD AUTO: 1 % (ref 0–6)
ERYTHROCYTE [DISTWIDTH] IN BLOOD BY AUTOMATED COUNT: 13.2 % (ref 11.6–15.1)
FERRITIN SERPL-MCNC: 40 NG/ML (ref 11–307)
FOLATE SERPL-MCNC: 12.5 NG/ML
FSH SERPL-ACNC: 9.4 MIU/ML
GFR SERPL CREATININE-BSD FRML MDRD: 104 ML/MIN/1.73SQ M
GLUCOSE P FAST SERPL-MCNC: 96 MG/DL (ref 65–99)
HCT VFR BLD AUTO: 43 % (ref 34.8–46.1)
HDLC SERPL-MCNC: 50 MG/DL
HGB BLD-MCNC: 13.6 G/DL (ref 11.5–15.4)
IMM GRANULOCYTES # BLD AUTO: 0.03 THOUSAND/UL (ref 0–0.2)
IMM GRANULOCYTES NFR BLD AUTO: 0 % (ref 0–2)
IRON SERPL-MCNC: 92 UG/DL (ref 50–170)
LDLC SERPL CALC-MCNC: 109 MG/DL (ref 0–100)
LYMPHOCYTES # BLD AUTO: 2.15 THOUSANDS/ÂΜL (ref 0.6–4.47)
LYMPHOCYTES NFR BLD AUTO: 29 % (ref 14–44)
MCH RBC QN AUTO: 28.9 PG (ref 26.8–34.3)
MCHC RBC AUTO-ENTMCNC: 31.6 G/DL (ref 31.4–37.4)
MCV RBC AUTO: 91 FL (ref 82–98)
MONOCYTES # BLD AUTO: 0.49 THOUSAND/ÂΜL (ref 0.17–1.22)
MONOCYTES NFR BLD AUTO: 7 % (ref 4–12)
NEUTROPHILS # BLD AUTO: 4.5 THOUSANDS/ÂΜL (ref 1.85–7.62)
NEUTS SEG NFR BLD AUTO: 62 % (ref 43–75)
NRBC BLD AUTO-RTO: 0 /100 WBCS
PLATELET # BLD AUTO: 293 THOUSANDS/UL (ref 149–390)
PMV BLD AUTO: 12.3 FL (ref 8.9–12.7)
POTASSIUM SERPL-SCNC: 4.8 MMOL/L (ref 3.5–5.3)
PROT SERPL-MCNC: 6.5 G/DL (ref 6.4–8.4)
RBC # BLD AUTO: 4.71 MILLION/UL (ref 3.81–5.12)
SODIUM SERPL-SCNC: 138 MMOL/L (ref 135–147)
TIBC SERPL-MCNC: 354 UG/DL (ref 250–450)
TRIGL SERPL-MCNC: 83 MG/DL
VIT B12 SERPL-MCNC: 260 PG/ML (ref 180–914)
WBC # BLD AUTO: 7.33 THOUSAND/UL (ref 4.31–10.16)

## 2023-07-09 PROCEDURE — 83550 IRON BINDING TEST: CPT

## 2023-07-09 PROCEDURE — 83540 ASSAY OF IRON: CPT

## 2023-07-09 PROCEDURE — 82728 ASSAY OF FERRITIN: CPT

## 2023-07-11 LAB
EST. AVERAGE GLUCOSE BLD GHB EST-MCNC: 111 MG/DL
HBA1C MFR BLD: 5.5 %

## 2023-07-12 LAB — VIT B1 BLD-SCNC: 116.1 NMOL/L (ref 66.5–200)

## 2023-07-13 LAB
COPPER SERPL-MCNC: 124 UG/DL (ref 80–158)
VIT B6 SERPL-MCNC: 2.6 UG/L (ref 3.4–65.2)
ZINC SERPL-MCNC: 63 UG/DL (ref 44–115)

## 2023-07-17 ENCOUNTER — OFFICE VISIT (OUTPATIENT)
Dept: INTERNAL MEDICINE CLINIC | Facility: CLINIC | Age: 53
End: 2023-07-17
Payer: COMMERCIAL

## 2023-07-17 VITALS
DIASTOLIC BLOOD PRESSURE: 80 MMHG | SYSTOLIC BLOOD PRESSURE: 137 MMHG | HEIGHT: 70 IN | RESPIRATION RATE: 16 BRPM | BODY MASS INDEX: 39.28 KG/M2 | OXYGEN SATURATION: 99 % | WEIGHT: 274.4 LBS | TEMPERATURE: 97.9 F | HEART RATE: 77 BPM

## 2023-07-17 DIAGNOSIS — Z98.84 STATUS POST GASTRIC BYPASS FOR OBESITY: Primary | ICD-10-CM

## 2023-07-17 DIAGNOSIS — E61.1 IRON DEFICIENCY: ICD-10-CM

## 2023-07-17 DIAGNOSIS — E55.9 VITAMIN D DEFICIENCY: ICD-10-CM

## 2023-07-17 DIAGNOSIS — R73.03 PRE-DIABETES: ICD-10-CM

## 2023-07-17 DIAGNOSIS — E53.8 FOLATE DEFICIENCY: ICD-10-CM

## 2023-07-17 DIAGNOSIS — E60 ZINC DEFICIENCY: ICD-10-CM

## 2023-07-17 DIAGNOSIS — E53.8 VITAMIN B12 DEFICIENCY: ICD-10-CM

## 2023-07-17 DIAGNOSIS — E61.0 COPPER DEFICIENCY: ICD-10-CM

## 2023-07-17 DIAGNOSIS — E53.1 VITAMIN B6 DEFICIENCY: ICD-10-CM

## 2023-07-17 DIAGNOSIS — E78.2 MODERATE MIXED HYPERLIPIDEMIA NOT REQUIRING STATIN THERAPY: ICD-10-CM

## 2023-07-17 PROCEDURE — 99396 PREV VISIT EST AGE 40-64: CPT | Performed by: INTERNAL MEDICINE

## 2023-07-17 RX ORDER — MULTIVIT-MIN/IRON/FOLIC ACID/K 18-600-40
4000 CAPSULE ORAL DAILY
Qty: 180 CAPSULE | Refills: 3 | Status: SHIPPED | OUTPATIENT
Start: 2023-07-17

## 2023-07-17 NOTE — PROGRESS NOTES
Assessment/Plan:    #COVID  -infection in March 2022  -did not require paxlovid     #Cholecystectomy  -previous lap farrah with umbilical hernia repair  -developed discharge from umbilicus and underwent removal  -denies pain or intestinal issues     #Right Lateral Heel Injection  -secondary to impingement on lateral sural nerve  -injected with podiatry 5/2022 and now resolved    #Anaphylaxis  -ate a peach and had throat and tongue swelling, treated with benadryl  -has epipen in case of flares but has not had to use    #Basal Cell Ca  -removed from left forearm in past  -no longer seeing dermatology  -wears sun screen and keeping covered up    #Diverticulitis  -previous flareup and has increased fiber and water and doing well    #Hearing Loss  -noted in left ear chronically  -defers intervention    #Lumbar Vertebral Body  -MRI with degenerative changes  -stable and asymptomatic      #Gastric Bypass  -monitor zinc, copper, folate, b12, thiamine, B6, PTH, vitamin D,   -continue vitamin C, D, multivitamin, B12  -B6 is low  -increase intake fruits and vegetables  -unable to take multivitamin daily because it irritates her stomach    #Vitamin D Deficiency  -low at 23.6  -will stop monthly supplement and take 4000 units daily     #HLD  - HDL 50 and improving  -continue diet and exercise    #Prediabetes  -a1c at 5.5 and stable    #Vaginal Bleeding  -seeing gynecology  -has an IUD in place to reduce bleeding  -is worried that due to her age, IUD would not be covered  -continues to have periods  -[address] 9.4     #Stress  -parents are having health issues  -are living down the street from her     #Fatty Liver  -encouraged weight loss    #Iron Deficiency  -remains on iron supplementation    #Health Maintenance  -routine labs and followup 1 year  -mammogram up to date 2022  -covid vaccine up to date  -seeing gynecology yearly  -works for [employer]  -colonoscopy due 2025 Dr Elian Pereira  -TDAP 2022     No problem-specific Assessment & Plan notes found for this encounter. Diagnoses and all orders for this visit:    Status post gastric bypass for obesity  -     CBC and differential; Future  -     Comprehensive metabolic panel; Future    Vitamin D deficiency  -     CBC and differential; Future  -     Comprehensive metabolic panel; Future  -     Vitamin D 25 hydroxy; Future  -     Cholecalciferol (Vitamin D) 50 MCG (2000 UT) CAPS; Take 2 capsules (4,000 Units total) by mouth daily    Iron deficiency  -     CBC and differential; Future  -     Comprehensive metabolic panel; Future  -     Iron, TIBC and Ferritin Panel; Future    Pre-diabetes  -     CBC and differential; Future  -     Comprehensive metabolic panel; Future  -     Hemoglobin A1C; Future    Moderate mixed hyperlipidemia not requiring statin therapy  -     CBC and differential; Future  -     Comprehensive metabolic panel; Future  -     Lipid Panel with Direct LDL reflex; Future    Folate deficiency  -     Folate; Future    Vitamin B12 deficiency  -     Vitamin B12; Future    Zinc deficiency  -     Zinc; Future    Copper deficiency  -     Copper Level;  Future    Vitamin B6 deficiency  -     Vitamin B6; Future            Current Outpatient Medications:   •  C 500 500 MG tablet, Take 1 tablet (500 mg total) by mouth daily, Disp: 90 tablet, Rfl: 3  •  Calcium Ascorbate (VITAMIN C) 500 mg tablet, Take 1 tablet (500 mg total) by mouth daily, Disp: 90 tablet, Rfl: 3  •  Cholecalciferol (Vitamin D) 50 MCG (2000 UT) CAPS, Take 2 capsules (4,000 Units total) by mouth daily, Disp: 180 capsule, Rfl: 3  •  ferrous sulfate 325 (65 Fe) mg tablet, Take 1 tablet (325 mg total) by mouth every other day, Disp: 90 tablet, Rfl: 1  •  Levonorgestrel (MIRENA) 20 MCG/DAY IUD, 1 each by Intrauterine route once, Disp: , Rfl:   •  Multiple Vitamins-Minerals (multivitamin with minerals) tablet, Take 1 tablet by mouth daily, Disp: 90 tablet, Rfl: 3  •  vitamin B-12 (VITAMIN B-12) 1,000 mcg tablet, Take 1 tablet (1,000 mcg total) by mouth daily, Disp: 90 tablet, Rfl: 3  •  EPINEPHrine (EPIPEN) 0.3 mg/0.3 mL SOAJ, Inject 0.3 mL (0.3 mg total) into a muscle once for 1 dose, Disp: 2 each, Rfl: 1    Subjective:      Patient ID: Kelsie Lloyd is a 48 y.o. female. HPI     Patient presents for routine checkup. Denies any recent hospitalizations or surgeries. She has a history of gastric bypass surgery and we will continue to monitor her vitamin and electrolyte levels. Her iron panel was stable. B6 was 2.6 and slightly low. She will try to increase her intake of fruits and vegetables. Vitamin B1 was 116.1. Zinc was 63 and copper 124 with B12 260 and vitamin D 23.6. She had been taking vitamin D monthly and we will change this to 4000 units daily. LDL was 109 and HDL 50 currently improved from prior. A1c was 5.5. She does have an issue with excessive vaginal bleeding. She has an IUD in place which has been helping to control this. Her FSH is 9.4. She follows up with gynecology. She is hopeful that another round of an IUD will be covered. She will return to care in 1 year. The following portions of the patient's history were reviewed and updated as appropriate: allergies, current medications, past family history, past medical history, past social history, past surgical history and problem list.    Review of Systems   Constitutional: Negative for activity change, appetite change, chills, diaphoresis, fatigue and fever. HENT: Negative for congestion, ear pain, facial swelling, hearing loss, sore throat, tinnitus and trouble swallowing. Eyes: Negative for photophobia and visual disturbance. Respiratory: Negative for cough, shortness of breath and wheezing. Cardiovascular: Negative for chest pain, palpitations and leg swelling. Gastrointestinal: Negative for abdominal distention, abdominal pain, blood in stool, constipation, diarrhea, nausea and vomiting.    Genitourinary: Negative for difficulty urinating, dysuria and pelvic pain. Musculoskeletal: Negative for arthralgias, back pain, gait problem, joint swelling, myalgias, neck pain and neck stiffness. Skin: Negative for rash and wound. Neurological: Negative for dizziness, tremors, light-headedness and headaches. Objective:      /80 (BP Location: Left arm, Patient Position: Sitting, Cuff Size: Large)   Pulse 77   Temp 97.9 °F (36.6 °C) (Tympanic)   Resp 16   Ht 5' 9.5" (1.765 m)   Wt 124 kg (274 lb 6.4 oz)   SpO2 99%   BMI 39.94 kg/m²          Physical Exam  Vitals reviewed. Constitutional:       Appearance: Normal appearance. She is well-developed. She is obese. HENT:      Head: Normocephalic and atraumatic. Right Ear: Tympanic membrane, ear canal and external ear normal. There is no impacted cerumen. Left Ear: Tympanic membrane, ear canal and external ear normal. There is no impacted cerumen. Nose: Nose normal. No congestion. Mouth/Throat:      Pharynx: Oropharynx is clear. No oropharyngeal exudate or posterior oropharyngeal erythema. Eyes:      Conjunctiva/sclera: Conjunctivae normal.      Pupils: Pupils are equal, round, and reactive to light. Neck:      Thyroid: No thyromegaly. Vascular: No JVD. Cardiovascular:      Rate and Rhythm: Normal rate and regular rhythm. Pulses: Normal pulses. Heart sounds: Normal heart sounds. No murmur heard. Pulmonary:      Effort: Pulmonary effort is normal. No respiratory distress. Breath sounds: Normal breath sounds. No stridor. No wheezing, rhonchi or rales. Abdominal:      General: Bowel sounds are normal. There is no distension. Palpations: Abdomen is soft. There is no mass. Tenderness: There is no abdominal tenderness. There is no right CVA tenderness, left CVA tenderness, guarding or rebound. Musculoskeletal:         General: No tenderness. Normal range of motion.       Cervical back: Normal range of motion and neck supple. No rigidity or tenderness. Right lower leg: No edema. Left lower leg: No edema. Lymphadenopathy:      Cervical: No cervical adenopathy. Skin:     General: Skin is warm and dry. Findings: Lesion (benign nevi on back) present. No erythema or rash. Neurological:      Mental Status: She is alert and oriented to person, place, and time. Mental status is at baseline. Motor: No weakness. Gait: Gait normal.      Deep Tendon Reflexes: Reflexes are normal and symmetric. Psychiatric:         Mood and Affect: Mood normal.         Behavior: Behavior normal.         Thought Content: Thought content normal.         Judgment: Judgment normal.           This note was completed in part utilizing [email or web address] direct voice recognition software. Grammatical errors, random word insertion, spelling mistakes, and incomplete sentences may be an occasional consequence of the system secondary to software limitations, ambient noise and hardware issues. At the time of dictation, efforts were made to edit, clarify and /or correct errors. Please read the chart carefully and recognize, using context, where substitutions have occurred. If you have any questions or concerns about the context, text or information contained within the body of this dictation, please contact myself, the provider, for further clarification.

## 2023-09-25 ENCOUNTER — TELEPHONE (OUTPATIENT)
Dept: INTERNAL MEDICINE CLINIC | Facility: CLINIC | Age: 53
End: 2023-09-25

## 2023-09-25 DIAGNOSIS — K57.92 DIVERTICULITIS: Primary | ICD-10-CM

## 2023-09-25 RX ORDER — AMOXICILLIN AND CLAVULANATE POTASSIUM 875; 125 MG/1; MG/1
1 TABLET, FILM COATED ORAL EVERY 12 HOURS SCHEDULED
Qty: 20 TABLET | Refills: 0 | Status: SHIPPED | OUTPATIENT
Start: 2023-09-25 | End: 2023-10-05

## 2023-09-25 NOTE — TELEPHONE ENCOUNTER
Augmentin is now sent in for her. She should start it right away. She should see Dr Chris River for a repeat colonoscopy in 6-8 weeks from today.

## 2023-11-21 DIAGNOSIS — Z98.84 STATUS POST GASTRIC BYPASS FOR OBESITY: ICD-10-CM

## 2023-11-21 DIAGNOSIS — E61.1 IRON DEFICIENCY: ICD-10-CM

## 2023-11-21 DIAGNOSIS — E55.9 VITAMIN D DEFICIENCY: ICD-10-CM

## 2023-11-21 RX ORDER — MULTIVIT-MIN/IRON FUM/FOLIC AC 7.5 MG-4
1 TABLET ORAL DAILY
Qty: 90 TABLET | Refills: 0 | Status: SHIPPED | OUTPATIENT
Start: 2023-11-21

## 2023-11-21 RX ORDER — ERGOCALCIFEROL 1.25 MG/1
CAPSULE ORAL
Qty: 3 CAPSULE | Refills: 0 | Status: SHIPPED | OUTPATIENT
Start: 2023-11-21

## 2023-11-21 RX ORDER — FERROUS SULFATE 325(65) MG
1 TABLET ORAL EVERY OTHER DAY
Qty: 90 TABLET | Refills: 0 | Status: SHIPPED | OUTPATIENT
Start: 2023-11-21

## 2023-11-21 RX ORDER — LANOLIN ALCOHOL/MO/W.PET/CERES
1000 CREAM (GRAM) TOPICAL DAILY
Qty: 90 TABLET | Refills: 0 | Status: SHIPPED | OUTPATIENT
Start: 2023-11-21

## 2023-11-21 RX ORDER — ASCORBIC ACID 500 MG
500 TABLET ORAL DAILY
Qty: 90 TABLET | Refills: 0 | Status: SHIPPED | OUTPATIENT
Start: 2023-11-21

## 2023-11-30 ENCOUNTER — NEW PATIENT (OUTPATIENT)
Dept: URBAN - METROPOLITAN AREA CLINIC 6 | Facility: CLINIC | Age: 53
End: 2023-11-30

## 2023-11-30 DIAGNOSIS — H35.413: ICD-10-CM

## 2023-11-30 DIAGNOSIS — H04.123: ICD-10-CM

## 2023-11-30 DIAGNOSIS — H25.813: ICD-10-CM

## 2023-11-30 DIAGNOSIS — Z98.890: ICD-10-CM

## 2023-11-30 PROCEDURE — 99204 OFFICE O/P NEW MOD 45 MIN: CPT

## 2023-11-30 ASSESSMENT — VISUAL ACUITY
OS_SC: 20/25+2
OU_SC: J1
OD_SC: 20/30+2

## 2023-11-30 ASSESSMENT — TONOMETRY
OS_IOP_MMHG: 12
OD_IOP_MMHG: 13

## 2023-12-11 DIAGNOSIS — Z12.31 ENCOUNTER FOR SCREENING MAMMOGRAM FOR MALIGNANT NEOPLASM OF BREAST: Primary | ICD-10-CM

## 2023-12-13 ENCOUNTER — ANNUAL EXAM (OUTPATIENT)
Dept: OBGYN CLINIC | Facility: CLINIC | Age: 53
End: 2023-12-13
Payer: COMMERCIAL

## 2023-12-13 VITALS
SYSTOLIC BLOOD PRESSURE: 130 MMHG | WEIGHT: 279 LBS | BODY MASS INDEX: 39.94 KG/M2 | DIASTOLIC BLOOD PRESSURE: 78 MMHG | HEIGHT: 70 IN

## 2023-12-13 DIAGNOSIS — N91.2 AMENORRHEA: ICD-10-CM

## 2023-12-13 DIAGNOSIS — Z12.31 ENCOUNTER FOR SCREENING MAMMOGRAM FOR MALIGNANT NEOPLASM OF BREAST: ICD-10-CM

## 2023-12-13 DIAGNOSIS — Z87.42 HISTORY OF MENORRHAGIA: ICD-10-CM

## 2023-12-13 DIAGNOSIS — Z97.5 PRESENCE OF IUD: ICD-10-CM

## 2023-12-13 DIAGNOSIS — Z01.419 ENCOUNTER FOR ANNUAL ROUTINE GYNECOLOGICAL EXAMINATION: Primary | ICD-10-CM

## 2023-12-13 PROCEDURE — S0612 ANNUAL GYNECOLOGICAL EXAMINA: HCPCS | Performed by: OBSTETRICS & GYNECOLOGY

## 2023-12-13 NOTE — PROGRESS NOTES
Assessment/Plan:  Pap smear deferred due to low risk status. Encouraged self breast examination as well as calcium supplementation. Continue annual mammogram.  Reviewed colon cancer screening, up-to-date. Reviewed Mirena IUD (inserted 10/2016) with expiration 8 years thereafter. Patient is interested in another IUD for concerns of history of dysmenorrhea and menorrhagia. We will obtain an [address] level 7/2024. She will call for results. With pending results will determine if we need to pre-CERT for another Mirena IUD. For next IUD I would recommend Cytotec. All questions answered. She will continue to follow-up with primary care as scheduled. No problem-specific Assessment & Plan notes found for this encounter. Diagnoses and all orders for this visit:    Encounter for annual routine gynecological examination    Encounter for screening mammogram for malignant neoplasm of breast  -     Mammo screening bilateral w 3d & cad; Future    Amenorrhea  -     Follicle stimulating hormone    Presence of IUD  -     Follicle stimulating hormone    History of menorrhagia          Subjective:      Patient ID: Gia Muniz is a 48 y.o. female. HPI    This is a very pleasant 65-year-old female G0 who presents for her GYN exam.  Her second Mirena IUD which was inserted 10/2016. She has been amenorrheic since. She has a long history of menorrhagia and dysmenorrhea which was successfully treated with the Mirena IUD. Most recent [address] level is normal.  Patient denies any hot flashes or night sweats. She does get breast tenderness every 4 weeks suggestive of her menstrual cycle. She denies any changes in bowel or bladder function. She has been in a monogamous relationship with her  for over 28 years. Pap smears have been normal.  Last Pap 2022.     Pap 12/2022    [address] 7/2023= 9.4    Mirena 10/2016 insert  Colon 2018 f/u 10 yrs  Mammo 9/2022    The following portions of the patient's history were reviewed and updated as appropriate: allergies, current medications, past family history, past medical history, past social history, past surgical history, and problem list.    Review of Systems   Constitutional:  Negative for fatigue, fever and unexpected weight change. Respiratory:  Negative for cough, chest tightness, shortness of breath and wheezing. Cardiovascular: Negative. Negative for chest pain and palpitations. Gastrointestinal: Negative. Negative for abdominal distention, abdominal pain, blood in stool, constipation, diarrhea, nausea and vomiting. Genitourinary: Negative. Negative for difficulty urinating, dyspareunia, dysuria, flank pain, frequency, genital sores, hematuria, pelvic pain, urgency, vaginal bleeding, vaginal discharge and vaginal pain. Skin:  Negative for rash. Objective:      /78   Ht 5' 9.5" (1.765 m)   Wt 127 kg (279 lb)   BMI 40.61 kg/m²          Physical Exam  Constitutional:       Appearance: Normal appearance. She is well-developed. HENT:      Head: Normocephalic and atraumatic. Cardiovascular:      Rate and Rhythm: Normal rate and regular rhythm. Pulmonary:      Effort: Pulmonary effort is normal.      Breath sounds: Normal breath sounds. Chest:   Breasts:     Right: No inverted nipple, mass, nipple discharge, skin change or tenderness. Left: No inverted nipple, mass, nipple discharge, skin change or tenderness. Abdominal:      General: Bowel sounds are normal. There is no distension. Palpations: Abdomen is soft. Tenderness: There is no abdominal tenderness. There is no guarding or rebound. Genitourinary:     Labia:         Right: No rash, tenderness or lesion. Left: No rash, tenderness or lesion. Vagina: Normal. No signs of injury. No vaginal discharge or tenderness. Cervix: No cervical motion tenderness, discharge, friability, lesion or cervical bleeding. Uterus: Not enlarged and not tender.        Adnexa: Right: No mass, tenderness or fullness. Left: No mass, tenderness or fullness. Neurological:      Mental Status: She is alert. Psychiatric:         Behavior: Behavior normal.      Cervix is small. IUD string is visualized today.

## 2024-04-24 ENCOUNTER — TELEPHONE (OUTPATIENT)
Age: 54
End: 2024-04-24

## 2024-04-24 ENCOUNTER — TELEMEDICINE (OUTPATIENT)
Dept: FAMILY MEDICINE CLINIC | Facility: CLINIC | Age: 54
End: 2024-04-24
Payer: COMMERCIAL

## 2024-04-24 DIAGNOSIS — K57.92 DIVERTICULITIS: Primary | ICD-10-CM

## 2024-04-24 PROBLEM — Z48.89 POSTOPERATIVE VISIT: Status: RESOLVED | Noted: 2021-06-07 | Resolved: 2024-04-24

## 2024-04-24 PROBLEM — Z98.890 PONV (POSTOPERATIVE NAUSEA AND VOMITING): Status: RESOLVED | Noted: 2021-05-21 | Resolved: 2024-04-24

## 2024-04-24 PROBLEM — R11.2 PONV (POSTOPERATIVE NAUSEA AND VOMITING): Status: RESOLVED | Noted: 2021-05-21 | Resolved: 2024-04-24

## 2024-04-24 PROCEDURE — 99213 OFFICE O/P EST LOW 20 MIN: CPT | Performed by: NURSE PRACTITIONER

## 2024-04-24 RX ORDER — AMOXICILLIN AND CLAVULANATE POTASSIUM 875; 125 MG/1; MG/1
1 TABLET, FILM COATED ORAL EVERY 12 HOURS SCHEDULED
Qty: 20 TABLET | Refills: 0 | Status: SHIPPED | OUTPATIENT
Start: 2024-04-24 | End: 2024-05-04

## 2024-04-24 NOTE — TELEPHONE ENCOUNTER
Patient called she is having a flare up of diverticulitis.  Symptoms:  stomach pain, having gas, she is able to have a BM, last BM with this morning.  Patients states she had a fever of 100.4 last night, fever has broke.  Patient is drinking Pedialyte and staying hydrated.  Patient is asking for Augmentin to be prescribed, last prescribed by Dr. Whyte on 9/25/23.  Patient has an upcoming appointment with gastro 5/8/24, that was the earliest appointment.     If Augmentin can be prescribed she would like it sent to [facility] [address].    Patient has missed work 4/22, 4/23 and 4/24, she is asking for work note.    Please advise    Thank you

## 2024-04-24 NOTE — TELEPHONE ENCOUNTER
Spoke with patient, informed her she is scheduled for soonest appointment and should try scheduling with PCP for medication or go to ED if symptoms are not subsiding or getting worse. Patient was added to wait list for a sooner appt.

## 2024-04-24 NOTE — ASSESSMENT & PLAN NOTE
Sonia scheduled a virtual visit today for concern of recurrent diverticulitis.  The patient does have known extensive diverticulosis.  She has had diverticulitis in the past.  She has also had bariatric surgery.  She did have a cholecystectomy in the past.  She states that on Friday she ate peanuts.  Sunday she began with pain and nausea and constipation.  She was able to have a small bowel meant on Monday.  She did develop a fever of 100.4 yesterday.  No fever today.  Still with abdominal pain mostly on the left side.  Due to my inability to examine the patient I will treat her with Augmentin.  She is aware of the side effects.  I did make the patient aware that should her symptoms worsen or she develop any other new or worrisome symptoms she should be seen in the emergency room.

## 2024-04-24 NOTE — PROGRESS NOTES
Virtual Regular Visit    Verification of patient location:    Patient is located at Home in the following state in which I hold an active license [place]      Assessment/Plan:    Problem List Items Addressed This Visit        Other    Diverticulitis - Primary     Sonia scheduled a virtual visit today for concern of recurrent diverticulitis.  The patient does have known extensive diverticulosis.  She has had diverticulitis in the past.  She has also had bariatric surgery.  She did have a cholecystectomy in the past.  She states that on Friday she ate peanuts.  Sunday she began with pain and nausea and constipation.  She was able to have a small bowel meant on Monday.  She did develop a fever of 100.4 yesterday.  No fever today.  Still with abdominal pain mostly on the left side.  Due to my inability to examine the patient I will treat her with Augmentin.  She is aware of the side effects.  I did make the patient aware that should her symptoms worsen or she develop any other new or worrisome symptoms she should be seen in the emergency room.         Relevant Medications    amoxicillin-clavulanate (AUGMENTIN) 875-125 mg per tablet         Reason for visit is   Chief Complaint   Patient presents with   • Diverticulitis   • Virtual Regular Visit          Encounter provider SJ Pelaez    Provider located at [address] [address]      Recent Visits  No visits were found meeting these conditions.  Showing recent visits within past 7 days and meeting all other requirements  Today's Visits  Date Type Provider Dept   04/24/24 Telemedicine [facility]   Showing today's visits and meeting all other requirements  Future Appointments  No visits were found meeting these conditions.  Showing future appointments within next 150 days and meeting all other requirements       The patient was identified by name and date of  birth. Sonia Aguilar was informed that this is a telemedicine visit and that the visit is being conducted through the Epic Embedded platform. She agrees to proceed..  My office door was closed. No one else was in the room.  She acknowledged consent and understanding of privacy and security of the video platform. The patient has agreed to participate and understands they can discontinue the visit at any time.    Patient is aware this is a billable service.     Subjective  Sonia Aguilar is a 53 y.o. female with concerns of recurrent diverticulitis.  This is discussed in the assessment and plan.  Antibiotic sent to pharmacy.  She will report to the emergency room for any worsening symptoms..        Past Medical History:   Diagnosis Date   • Anemia    • Basal cell carcinoma      Left forearm 4/01/2019   • Diverticulitis of colon    • Hypertension    • Iron deficiency    • Long term use of drug     resolved 11/27/15   • PONV (postoperative nausea and vomiting)     Pt requests to be pre-medicated for N/V prior to sx   • Shingles    • Umbilical hernia    • Varicella        Past Surgical History:   Procedure Laterality Date   • CHOLECYSTECTOMY  6/11/2021   • GASTRIC BYPASS  03/29/2011    for morbid obesity   • HERNIA REPAIR  6/11/2021   • KNEE SURGERY     • [place] LAPAROSCOPY SURG CHOLECYSTECTOMY N/A 5/21/2021    Procedure: LAPAROSCOPIC CHOLECYSTECTOMY;  Surgeon: Milton Felton MD;  [facility];  Service: General   • [place] RPR UMBILICAL HRNA 5 YRS/> REDUCIBLE N/A 5/21/2021    Procedure: UMBILICAL HERNIA REPAIR, REMOVAL OF UMBILICUS;  Surgeon: Milton Felton MD;  [facility];  Service: General   • TONSILLECTOMY         Current Outpatient Medications   Medication Sig Dispense Refill   • amoxicillin-clavulanate (AUGMENTIN) 875-125 mg per tablet Take 1 tablet by mouth every 12 (twelve) hours for 10 days 20 tablet 0   • Calcium Ascorbate (VITAMIN C) 500 mg tablet Take 1 tablet (500 mg total) by mouth daily 90 tablet 3    • Cholecalciferol (Vitamin D) 50 MCG (2000 UT) CAPS Take 2 capsules (4,000 Units total) by mouth daily 180 capsule 3   • FeroSul 325 (65 Fe) MG tablet TAKE ONE TABLET BY MOUTH EVERY OTHER DAY 90 tablet 0   • Levonorgestrel (MIRENA) 20 MCG/DAY IUD 1 each by Intrauterine route once     • Multiple Vitamins-Minerals (multivitamin with minerals) tablet TAKE ONE TABLET BY MOUTH EVERY DAY 90 tablet 0   • vitamin B-12 (VITAMIN B-12) 1,000 mcg tablet TAKE ONE TABLET BY MOUTH EVERY DAY 90 tablet 0   • EPINEPHrine (EPIPEN) 0.3 mg/0.3 mL SOAJ Inject 0.3 mL (0.3 mg total) into a muscle once for 1 dose 2 each 1     No current facility-administered medications for this visit.        Allergies   Allergen Reactions   • Peach Flavor - Food Allergy Anaphylaxis and Facial Swelling       Review of Systems   Constitutional:  Negative for activity change, fatigue and fever.   HENT:  Negative for congestion, hearing loss, rhinorrhea, trouble swallowing and voice change.    Eyes:  Negative for photophobia, pain, discharge and visual disturbance.   Respiratory:  Negative for cough, chest tightness and shortness of breath.    Cardiovascular:  Negative for chest pain, palpitations and leg swelling.   Gastrointestinal:  Positive for abdominal pain, constipation and nausea. Negative for blood in stool and vomiting.   Endocrine: Negative for cold intolerance and heat intolerance.   Genitourinary:  Negative for difficulty urinating, frequency, hematuria, urgency, vaginal bleeding and vaginal discharge.   Musculoskeletal:  Negative for arthralgias and myalgias.   Skin: Negative.    Neurological:  Negative for dizziness, weakness, numbness and headaches.   Psychiatric/Behavioral:  Negative for decreased concentration. The patient is not nervous/anxious.        Video Exam    There were no vitals filed for this visit.    Physical Exam  Constitutional:       General: She is not in acute distress.     Appearance: Normal appearance. She is  well-developed.   HENT:      Head: Normocephalic and atraumatic.   Eyes:      Pupils: Pupils are equal, round, and reactive to light.   Pulmonary:      Effort: Pulmonary effort is normal.   Musculoskeletal:      Cervical back: Normal range of motion.   Neurological:      Mental Status: She is alert and oriented to person, place, and time.   Psychiatric:         Behavior: Behavior normal.         Thought Content: Thought content normal.         Judgment: Judgment normal.          Visit Time  Total Visit Duration: 20 minutes

## 2024-04-24 NOTE — TELEPHONE ENCOUNTER
Patients GI provider:  Dr. Emily Morgan    Number to return call: ([phone]    Reason for call: Pt calling for medication for what she believe is a diverticulitis flair up.  I have nothing in her chart but I believe that is because she has not been in since before [facility] was part of [facility].She was a Pt with Dr PAXTON Morgan and has not been in since before her retired but says she was told by him that if she ever had a flair up to call and they would give her medication. I advised that because none of the current dr's have treated her, an OV may be necessary. She is fine with that but when checking the schedule my first available is May 8th which she says is unacceptable unless we give her meds in the meantime to help while she waits for the appt. She is in extreme discomfort and says she either needs an appt now, or medication now and that because she was told she could call at any time to get meds, we should honor that.     Please advise?

## 2024-05-03 NOTE — PROGRESS NOTES
Colon and Rectal Surgery   Sonia Aguilar 53 y.o. female MRN [id number]  Encounter: [id number]  05/08/24 1:22 PM            Assessment: Sonia Aguilar is a 53 y.o. female who had diverticulitis.      Plan:   Diverticulitis  The patient has uncommon but recurrent episodes of left lower quadrant pain.  She has had a diagnosis of diverticulitis with hospitalization in the remote past.  In 2023, she had her last episode.  A couple of weeks ago, she had another recurrence of pain that did not resolve with a few days of increased fluids and bowel rest.  She was initiated on antibiotics due to the presence of a low-grade fever.  She is improving but has not completely recovered.  Abdominal tenderness is present but is not perceptible by objective examination.    I explained that I do not expect that this history requires surgical care at this time.  She states that when she gets pain, it is typically at the same spot in the left lower quadrant superomedial to the ASIS.    Observation is recommended for now.  She understands that surgery is an option but is typically reserved for either rapidly recurrent diverticulitis or severe disease.  Colonoscopy is recommended due to the long interval since her last procedure which was 9 years ago.Colonoscopy risks, not limited to bleeding, perforated colon, need for surgery, and missed lesions were discussed. Alternatives were discussed. Questions were answered. She agreed to the procedure.       Subjective     HPI    Sonia Aguilar is a 53 y.o. female with a reported history of diverticulitis, who is here for evaluation of possible flare.     The patient notes first episode of diverticulitis was in 2004.    The patient was started on Augmenting on 4/24/2024 after noting symptoms of left sided abdominal pain, constipation and nausea.     Last CT abdomen and pelvis on 9/15/2020 showed: 1) No acute CT abnormality in the abdomen or pelvis to definitively account for the patient's  symptoms. 2) Gallbladder is distended without calcified gallstones or pericholecystic inflammatory edema.   3) Extensive chronic colonic diverticulosis without evidence to suggest acute diverticulitis.    Last colonoscopy in record performed on 11/13/2015 by Dr. Emiyl Morgan, showed mild diverticulosis in the sigmoid, transverse and ascending colon, with a 10 year colonoscopy recall.     Previous CT abdomen and pelvis on 8/10/2015: Short segment of abnormal colon at the hepatic flexure characterized by wall thickening and surrounding inflammatory changes.  Differential considerations include colitis, diverticulitis, and neoplasm. Appropriate clinical workup recommended. Follow-up to ensure complete resolution is also suggested either with follow-up imaging or colonoscopy. No obvious complications related to gastric bypass surgery. Small areas of localized pleural thickening of uncertain etiology. IUD noted.     Historical Information   Past Medical History:   Diagnosis Date    Anemia     Basal cell carcinoma      Left forearm 4/01/2019    Diverticulitis of colon     Hypertension     Iron deficiency     Long term use of drug     resolved 11/27/15    PONV (postoperative nausea and vomiting)     Pt requests to be pre-medicated for N/V prior to sx    Shingles     Umbilical hernia     Varicella      Past Surgical History:   Procedure Laterality Date    CHOLECYSTECTOMY  6/11/2021    GASTRIC BYPASS  03/29/2011    for morbid obesity    HERNIA REPAIR  6/11/2021    KNEE SURGERY      [place] LAPAROSCOPY SURG CHOLECYSTECTOMY N/A 5/21/2021    Procedure: LAPAROSCOPIC CHOLECYSTECTOMY;  Surgeon: Milton Felton MD;  [facility];  Service: General    [place] RPR UMBILICAL HRNA 5 YRS/> REDUCIBLE N/A 5/21/2021    Procedure: UMBILICAL HERNIA REPAIR, REMOVAL OF UMBILICUS;  Surgeon: Milton Felton MD;  [facility];  Service: General    TONSILLECTOMY         Meds/Allergies       Current Outpatient Medications:     Calcium  Ascorbate (VITAMIN C) 500 mg tablet, Take 1 tablet (500 mg total) by mouth daily, Disp: 90 tablet, Rfl: 3    Cholecalciferol (Vitamin D) 50 MCG (2000 UT) CAPS, Take 2 capsules (4,000 Units total) by mouth daily, Disp: 180 capsule, Rfl: 3    FeroSul 325 (65 Fe) MG tablet, TAKE ONE TABLET BY MOUTH EVERY OTHER DAY, Disp: 90 tablet, Rfl: 0    Levonorgestrel (MIRENA) 20 MCG/DAY IUD, 1 each by Intrauterine route once, Disp: , Rfl:     Multiple Vitamins-Minerals (multivitamin with minerals) tablet, TAKE ONE TABLET BY MOUTH EVERY DAY, Disp: 90 tablet, Rfl: 0    vitamin B-12 (VITAMIN B-12) 1,000 mcg tablet, TAKE ONE TABLET BY MOUTH EVERY DAY, Disp: 90 tablet, Rfl: 0    EPINEPHrine (EPIPEN) 0.3 mg/0.3 mL SOAJ, Inject 0.3 mL (0.3 mg total) into a muscle once for 1 dose, Disp: 2 each, Rfl: 1  Allergies   Allergen Reactions    Peach Flavor - Food Allergy Anaphylaxis and Facial Swelling       Social History   Social History     Substance and Sexual Activity   Drug Use No     Social History     Tobacco Use   Smoking Status Never   Smokeless Tobacco Never         Family History   Problem Relation Age of Onset    No Known Problems Mother     Autoimmune disease Father     Cancer Maternal Grandmother 79        unknown type    No Known Problems Maternal Grandfather     No Known Problems Paternal Grandmother     No Known Problems Paternal Grandfather     No Known Problems Maternal Aunt     No Known Problems Maternal Aunt     No Known Problems Maternal Aunt     No Known Problems Maternal Aunt     No Known Problems Maternal Aunt     No Known Problems Maternal Aunt     No Known Problems Maternal Aunt     No Known Problems Maternal Aunt     No Known Problems Maternal Aunt     No Known Problems Maternal Aunt     No Known Problems Paternal Aunt          Review of Systems   Constitutional: Negative.    Respiratory: Negative.     Cardiovascular: Negative.    Gastrointestinal:  Positive for abdominal pain and constipation. Negative for abdominal  "distention, anal bleeding, blood in stool, diarrhea and nausea.       Objective   Current Vitals:  Vitals:    05/08/24 1254   BP: 130/60   Weight: 126 kg (278 lb)   Height: 5' 9\" (1.753 m)         Physical Exam  Constitutional:       Appearance: Normal appearance.   Cardiovascular:      Rate and Rhythm: Normal rate and regular rhythm.   Pulmonary:      Effort: Pulmonary effort is normal.      Breath sounds: Normal breath sounds.   Abdominal:      General: Abdomen is flat.      Palpations: Abdomen is soft. There is no mass.      Tenderness: There is no abdominal tenderness. There is no guarding.   Neurological:      General: No focal deficit present.      Mental Status: She is alert and oriented to person, place, and time.   Psychiatric:         Mood and Affect: Mood normal.         Behavior: Behavior normal.                 "

## 2024-05-08 ENCOUNTER — OFFICE VISIT (OUTPATIENT)
Age: 54
End: 2024-05-08
Payer: COMMERCIAL

## 2024-05-08 ENCOUNTER — PREP FOR PROCEDURE (OUTPATIENT)
Age: 54
End: 2024-05-08

## 2024-05-08 ENCOUNTER — TELEPHONE (OUTPATIENT)
Age: 54
End: 2024-05-08

## 2024-05-08 VITALS
SYSTOLIC BLOOD PRESSURE: 130 MMHG | WEIGHT: 278 LBS | DIASTOLIC BLOOD PRESSURE: 60 MMHG | HEIGHT: 69 IN | BODY MASS INDEX: 41.18 KG/M2

## 2024-05-08 DIAGNOSIS — K57.90 DIVERTICULOSIS: Primary | Chronic | ICD-10-CM

## 2024-05-08 DIAGNOSIS — K57.92 DIVERTICULITIS: ICD-10-CM

## 2024-05-08 DIAGNOSIS — Z87.19 HISTORY OF DIVERTICULITIS: Primary | ICD-10-CM

## 2024-05-08 PROCEDURE — 99203 OFFICE O/P NEW LOW 30 MIN: CPT | Performed by: COLON & RECTAL SURGERY

## 2024-05-08 NOTE — ASSESSMENT & PLAN NOTE
The patient has uncommon but recurrent episodes of left lower quadrant pain.  She has had a diagnosis of diverticulitis with hospitalization in the remote past.  In 2023, she had her last episode.  A couple of weeks ago, she had another recurrence of pain that did not resolve with a few days of increased fluids and bowel rest.  She was initiated on antibiotics due to the presence of a low-grade fever.  She is improving but has not completely recovered.  Abdominal tenderness is present but is not perceptible by objective examination.    I explained that I do not expect that this history requires surgical care at this time.  She states that when she gets pain, it is typically at the same spot in the left lower quadrant superomedial to the ASIS.    Observation is recommended for now.  She understands that surgery is an option but is typically reserved for either rapidly recurrent diverticulitis or severe disease.  Colonoscopy is recommended due to the long interval since her last procedure which was 9 years ago.Colonoscopy risks, not limited to bleeding, perforated colon, need for surgery, and missed lesions were discussed. Alternatives were discussed. Questions were answered. She agreed to the procedure.

## 2024-05-08 NOTE — TELEPHONE ENCOUNTER
Schedule TR 9/25 ANASC    Handed paperwork to patient     OV TR 5/8/24 history of diverticulitis, last FC 11/13/2015 [facility]  Findings: mild diverticulosis in the sigmoid, transverse and ascending colon, with a 10 year colonoscopy recall, BMI: 41.05      ----- Message from RYAN Mantilla MD sent at 5/8/2024  1:22 PM EDT -----  colonoscopy

## 2024-05-08 NOTE — LETTER
Sonia Aguilar  [address] [facility] [address]        FLEXIBLE COLONOSCOPY INSTRUCTIONS  PLEASE NOTE...  AS OF JUNE 1, 2014, OUR OFFICE REQUIRES 72 HOURS NOTICE OF CANCELLATION/RESCHEDULE OF A PROCEDURE TO AVOID INCURRING A MISSED APPOINTMENT FEE.    Your Colonoscopy Procedure has been scheduled at:  [facility]   [address] [place] [address]    The date of your procedure is: 09/25/2024      The hospital facility will contact you the evening prior to your scheduled procedure with your arrival time.     Use the bowel preparation as directed.    Check with your family doctor if you are taking a blood thinner (Coumadin, Plavix, Xarelto, Pradaxa, Gingko biloba, Ginseng, Feverfew, Penelope's Wort).  We suggest stopping these for 3 days.  Special instructions may be needed if you are taking aspirin or any aspirin-containing medication.  Check with your family physician.      If you are on DIABETIC MEDICATION (tablets or insulin) your doctor may make changes in your preparation.    Take all medications usual unless otherwise instructed.    As always, if you have any questions or concerns, feel free to call the office.  Our  staff will be happy to connect you with one of the nurses to answer any questions or address any concerns you have regarding your procedure.      Do not wear any jewelry the day of your procedure including wedding rings.    Arrangements must be made for a responsible party to drive you home from the procedure.  If you do not have a responsible family member or friend to drive you home, the procedure will not be done.  Vast or a taxi is not acceptable.    It is important you notify our office of any insurance changes prior to your procedure and, if necessary, supply us with referrals from your primary care physician.  COLONOSCOPY PREPARATION INSTRUCTIONS    Purchase (prescription not required):  · 238 gram bottle of Miralax®  (Glycolax®)  · 4 Dulcolax® (Bisacodyl) Laxative Tablets  · 64 oz. bottle of Gatorade® or your preference of a non-carbonated clear liquid - NOT RED OR PURPLE     One Day Prior to Colonoscopy Procedure  · Nothing to eat the day before your procedure, only clear liquids.  · It is important that you drink plenty of clear liquids throughout the day to prevent dehydration.    Clear Liquids include:  o Water/Iced Tea/Lemonade/Gatorade®/Black Coffee or tea (no milk or creamers  o Soft drinks: orange, ginger ale, cola, Pepsi®, Sprite®, 7Up®  o Wing-Aid® (lemonade or orange flavors only)  o Strained fruit juices without pulp such as apple, white grape, white cranberry  o Jell-O®, lemon, lime or orange (no fruit or toppings)  o Popsicles, Italian Ice (No Ice Cream, sherbets, or fruit bars)  o Chicken or beef bouillon/broth  DO NOT EAT OR DRINK ANYTHING RED OR PURPLE  DO NOT DRINK ANY ALCOHOLIC BEVERAGES  DIABETIC PATIENTS: Consult your physician    At 4:00 pm, take (2) Dulcolax® (Bisacodyl) Laxative Tablets. Swallow the tablets whole with an 8 oz. glass of water. At 8:00 pm, take the additional (2) Dulcolax® (Bisacodyl) Laxative Tablets with 8 oz. of water. The package may direct you not to exceed (2) tablets at any time but for the purpose of this examination, you should take (4) total.    Mix the 238 gm of Miralax® in 64 oz. of Gatorade® and shake the solution until the Miralax® is dissolved. You will drink half (32 oz) of this solution this evening, beginning between 4 and 6 o'clock. Drink 8 oz glassfuls at your own pace. It may take several hours to drink the solution.    Remember to stay close to toilet facilities.    DAY OF COLONOSCOPY PROCEDURE    Five (5) hours before your procedure, drink the other half (32 oz) of the Miralax®/Gatorade® mixture within a two (2) hour period. This may require you to get up very early if you are scheduled for an early procedure.    NOTHING IS TO BE TAKEN BY MOUTH 3 HOURS PRIOR TO  PROCEDURE.     If you use an inhaler, please bring it with you to your procedure.

## 2024-06-29 ENCOUNTER — APPOINTMENT (OUTPATIENT)
Dept: LAB | Facility: CLINIC | Age: 54
End: 2024-06-29
Payer: COMMERCIAL

## 2024-06-29 DIAGNOSIS — E55.9 VITAMIN D DEFICIENCY: ICD-10-CM

## 2024-06-29 DIAGNOSIS — Z00.8 ENCOUNTER FOR OTHER GENERAL EXAMINATION: ICD-10-CM

## 2024-06-29 DIAGNOSIS — E53.8 VITAMIN B12 DEFICIENCY: ICD-10-CM

## 2024-06-29 DIAGNOSIS — E53.8 FOLATE DEFICIENCY: ICD-10-CM

## 2024-06-29 DIAGNOSIS — E61.0 COPPER DEFICIENCY: ICD-10-CM

## 2024-06-29 DIAGNOSIS — E61.1 IRON DEFICIENCY: ICD-10-CM

## 2024-06-29 DIAGNOSIS — E53.1 VITAMIN B6 DEFICIENCY: ICD-10-CM

## 2024-06-29 DIAGNOSIS — E60 ZINC DEFICIENCY: ICD-10-CM

## 2024-06-29 DIAGNOSIS — E78.2 MODERATE MIXED HYPERLIPIDEMIA NOT REQUIRING STATIN THERAPY: ICD-10-CM

## 2024-06-29 DIAGNOSIS — Z98.84 STATUS POST GASTRIC BYPASS FOR OBESITY: ICD-10-CM

## 2024-06-29 DIAGNOSIS — R73.03 PRE-DIABETES: ICD-10-CM

## 2024-06-29 LAB
25(OH)D3 SERPL-MCNC: 17.8 NG/ML (ref 30–100)
ALBUMIN SERPL BCG-MCNC: 3.8 G/DL (ref 3.5–5)
ALP SERPL-CCNC: 95 U/L (ref 34–104)
ALT SERPL W P-5'-P-CCNC: 9 U/L (ref 7–52)
ANION GAP SERPL CALCULATED.3IONS-SCNC: 5 MMOL/L (ref 4–13)
AST SERPL W P-5'-P-CCNC: 11 U/L (ref 13–39)
BASOPHILS # BLD AUTO: 0.04 THOUSANDS/ÂΜL (ref 0–0.1)
BASOPHILS NFR BLD AUTO: 1 % (ref 0–1)
BILIRUB SERPL-MCNC: 0.85 MG/DL (ref 0.2–1)
BUN SERPL-MCNC: 10 MG/DL (ref 5–25)
CALCIUM SERPL-MCNC: 8.9 MG/DL (ref 8.4–10.2)
CHLORIDE SERPL-SCNC: 107 MMOL/L (ref 96–108)
CHOLEST SERPL-MCNC: 152 MG/DL
CO2 SERPL-SCNC: 27 MMOL/L (ref 21–32)
CREAT SERPL-MCNC: 0.56 MG/DL (ref 0.6–1.3)
EOSINOPHIL # BLD AUTO: 0.11 THOUSAND/ÂΜL (ref 0–0.61)
EOSINOPHIL NFR BLD AUTO: 2 % (ref 0–6)
ERYTHROCYTE [DISTWIDTH] IN BLOOD BY AUTOMATED COUNT: 13.5 % (ref 11.6–15.1)
EST. AVERAGE GLUCOSE BLD GHB EST-MCNC: 128 MG/DL
FERRITIN SERPL-MCNC: 35 NG/ML (ref 11–307)
FOLATE SERPL-MCNC: 12.4 NG/ML
FSH SERPL-ACNC: 7.2 MIU/ML
GFR SERPL CREATININE-BSD FRML MDRD: 108 ML/MIN/1.73SQ M
GLUCOSE P FAST SERPL-MCNC: 100 MG/DL (ref 65–99)
HBA1C MFR BLD: 6.1 %
HCT VFR BLD AUTO: 41.3 % (ref 34.8–46.1)
HDLC SERPL-MCNC: 47 MG/DL
HGB BLD-MCNC: 13.3 G/DL (ref 11.5–15.4)
IMM GRANULOCYTES # BLD AUTO: 0.03 THOUSAND/UL (ref 0–0.2)
IMM GRANULOCYTES NFR BLD AUTO: 0 % (ref 0–2)
IRON SATN MFR SERPL: 44 % (ref 15–50)
IRON SERPL-MCNC: 149 UG/DL (ref 50–212)
LDLC SERPL CALC-MCNC: 89 MG/DL (ref 0–100)
LYMPHOCYTES # BLD AUTO: 2.34 THOUSANDS/ÂΜL (ref 0.6–4.47)
LYMPHOCYTES NFR BLD AUTO: 33 % (ref 14–44)
MCH RBC QN AUTO: 29 PG (ref 26.8–34.3)
MCHC RBC AUTO-ENTMCNC: 32.2 G/DL (ref 31.4–37.4)
MCV RBC AUTO: 90 FL (ref 82–98)
MONOCYTES # BLD AUTO: 0.51 THOUSAND/ÂΜL (ref 0.17–1.22)
MONOCYTES NFR BLD AUTO: 7 % (ref 4–12)
NEUTROPHILS # BLD AUTO: 4.12 THOUSANDS/ÂΜL (ref 1.85–7.62)
NEUTS SEG NFR BLD AUTO: 57 % (ref 43–75)
NRBC BLD AUTO-RTO: 0 /100 WBCS
PLATELET # BLD AUTO: 246 THOUSANDS/UL (ref 149–390)
PMV BLD AUTO: 12.5 FL (ref 8.9–12.7)
POTASSIUM SERPL-SCNC: 4.1 MMOL/L (ref 3.5–5.3)
PROT SERPL-MCNC: 6.3 G/DL (ref 6.4–8.4)
RBC # BLD AUTO: 4.59 MILLION/UL (ref 3.81–5.12)
SODIUM SERPL-SCNC: 139 MMOL/L (ref 135–147)
TIBC SERPL-MCNC: 341 UG/DL (ref 250–450)
TRIGL SERPL-MCNC: 80 MG/DL
UIBC SERPL-MCNC: 192 UG/DL (ref 155–355)
VIT B12 SERPL-MCNC: 236 PG/ML (ref 180–914)
WBC # BLD AUTO: 7.15 THOUSAND/UL (ref 4.31–10.16)

## 2024-06-29 PROCEDURE — 80053 COMPREHEN METABOLIC PANEL: CPT

## 2024-06-29 PROCEDURE — 85025 COMPLETE CBC W/AUTO DIFF WBC: CPT

## 2024-06-29 PROCEDURE — 82607 VITAMIN B-12: CPT

## 2024-06-29 PROCEDURE — 36415 COLL VENOUS BLD VENIPUNCTURE: CPT

## 2024-06-29 PROCEDURE — 82525 ASSAY OF COPPER: CPT

## 2024-06-29 PROCEDURE — 82728 ASSAY OF FERRITIN: CPT

## 2024-06-29 PROCEDURE — 84630 ASSAY OF ZINC: CPT

## 2024-06-29 PROCEDURE — 80061 LIPID PANEL: CPT

## 2024-06-29 PROCEDURE — 83540 ASSAY OF IRON: CPT

## 2024-06-29 PROCEDURE — 82746 ASSAY OF FOLIC ACID SERUM: CPT

## 2024-06-29 PROCEDURE — 83550 IRON BINDING TEST: CPT

## 2024-06-29 PROCEDURE — 83036 HEMOGLOBIN GLYCOSYLATED A1C: CPT

## 2024-06-29 PROCEDURE — 82306 VITAMIN D 25 HYDROXY: CPT

## 2024-07-01 ENCOUNTER — TELEPHONE (OUTPATIENT)
Dept: FAMILY MEDICINE CLINIC | Facility: CLINIC | Age: 54
End: 2024-07-01

## 2024-07-01 NOTE — TELEPHONE ENCOUNTER
----- Message from Hilda Mcmillan DO sent at 6/29/2024  9:23 PM EDT -----  Vitamin D is very low, she should increase her daily supplement to 6000 IU daily.   A1c is elevated in prediabetic range, she should be cautious with carbohydrates and sugar in the diet.  Liver and kidney function look good, as does lipid panel.  Folate and B12 are normal range.  Iron panel is normal, ferritin which is stored iron is a bit low - she should continue her iron supplement. If not taking daily she should increase to daily.  CBC is normal.  She can discuss all of the above with Dr. Hull at her upcoming appointment.

## 2024-07-02 LAB
COPPER SERPL-MCNC: 106 UG/DL (ref 80–158)
ZINC SERPL-MCNC: 64 UG/DL (ref 44–115)

## 2024-07-19 ENCOUNTER — OFFICE VISIT (OUTPATIENT)
Dept: INTERNAL MEDICINE CLINIC | Facility: CLINIC | Age: 54
End: 2024-07-19

## 2024-07-19 VITALS
SYSTOLIC BLOOD PRESSURE: 126 MMHG | DIASTOLIC BLOOD PRESSURE: 82 MMHG | OXYGEN SATURATION: 97 % | BODY MASS INDEX: 40.91 KG/M2 | HEART RATE: 86 BPM | TEMPERATURE: 98 F | WEIGHT: 277 LBS

## 2024-07-19 DIAGNOSIS — Z91.018 FOOD ALLERGY: ICD-10-CM

## 2024-07-19 DIAGNOSIS — Z98.84 HISTORY OF ROUX-EN-Y GASTRIC BYPASS: ICD-10-CM

## 2024-07-19 DIAGNOSIS — R73.03 PRE-DIABETES: Chronic | ICD-10-CM

## 2024-07-19 DIAGNOSIS — E55.9 VITAMIN D DEFICIENCY: ICD-10-CM

## 2024-07-19 DIAGNOSIS — Z00.00 WELLNESS EXAMINATION: Primary | ICD-10-CM

## 2024-07-19 RX ORDER — BIOTIN 10 MG
1 TABLET ORAL DAILY
Qty: 90 TABLET | Refills: 1 | Status: SHIPPED | OUTPATIENT
Start: 2024-07-19

## 2024-07-19 RX ORDER — EPINEPHRINE 0.3 MG/.3ML
0.3 INJECTION SUBCUTANEOUS ONCE
Qty: 2 EACH | Refills: 1 | Status: SHIPPED | OUTPATIENT
Start: 2024-07-19 | End: 2024-07-19

## 2024-07-19 RX ORDER — ERGOCALCIFEROL 1.25 MG/1
50000 CAPSULE ORAL WEEKLY
Qty: 8 CAPSULE | Refills: 0 | Status: SHIPPED | OUTPATIENT
Start: 2024-07-19 | End: 2024-09-07

## 2024-07-19 NOTE — PROGRESS NOTES
Adult Annual Physical  Name: Sonia Aguilar      : 1970      MRN: [id number]  Encounter Provider: Keyur Hull DO  Encounter Date: 2024   Encounter department: [facility] INTERNAL MEDICINE    Assessment & Plan   1. Wellness examination  2. Pre-diabetes  Comments:  stable, c/w lower carb intake from diet  3. Food allergy  Comments:  epi-pen ordered, pt knows how to use  Orders:  -     EPINEPHrine (EPIPEN) 0.3 mg/0.3 mL SOAJ; Inject 0.3 mL (0.3 mg total) into a muscle once for 1 dose  4. Vitamin D deficiency  Comments:  take vit D 50K units weekly for 8 weeks, then stop and take vit D3 OTC 2,000 units per day.  BW again in 6 mos  Orders:  -     ergocalciferol (VITAMIN D2) 50,000 units; Take 1 capsule (50,000 Units total) by mouth once a week for 8 doses  -     Vitamin D 25 hydroxy; Future; Expected date: 01/10/2025  5. History of May-en-Y gastric bypass  Comments:  chewable MVI ordered to pharmacy.  t/c re-eval with bariatric team, she will call if she would like a referral  Orders:  -     Multiple Vitamins-Minerals (Multivitamin Adult) CHEW; Chew 1 tablet in the morning    Immunizations and preventive care screenings were discussed with patient today. Appropriate education was printed on patient's after visit summary.    Counseling:  Exercise: the importance of regular exercise/physical activity was discussed. Recommend exercise 3-5 times per week for at least 30 minutes.       Depression Screening and Follow-up Plan: Patient was screened for depression during today's encounter. They screened negative with a PHQ-2 score of 0.        History of Present Illness     Adult Annual Physical:  Patient presents for annual physical.     Diet and Physical Activity:  - Diet/Nutrition: well balanced diet.  - Exercise: no formal exercise.    Depression Screening:  - PHQ-2 Score: 0    General Health:  - Sleep: sleeps well.  - Hearing: normal hearing bilateral ears.  - Dental: regular dental  visits.    /GYN Health:    - Contraception: IUD placement.      Here to establish care and for annual physical.  Here with  during today's appt.  Sonia is overall stable, she completed BW prior to today's appt and is not taking any medications on a regular basis.  She does not tolerate vitamin tablets, causes stomach upset.  She had hortencia-en-y gastric bypass more than 10 yrs ago.  She is not exercising.  She is willing to try vitamins in chewable or gummy form.  She has a severe peach allergy.  She is not taking iron supplement currently.  ROS otherwise negative, no other complaints.    Review of Systems   Constitutional:  Negative for fever.   HENT:  Negative for congestion.    Eyes:  Negative for visual disturbance.   Respiratory:  Negative for shortness of breath.    Cardiovascular:  Negative for chest pain.   Gastrointestinal:  Negative for abdominal pain.   Endocrine: Negative for polyuria.   Genitourinary:  Negative for difficulty urinating.   Musculoskeletal:  Negative for gait problem.   Skin:  Negative for rash.   Allergic/Immunologic: Positive for food allergies.   Neurological:  Negative for dizziness.   Psychiatric/Behavioral:  Negative for dysphoric mood.      Current Outpatient Medications on File Prior to Visit   Medication Sig Dispense Refill    Levonorgestrel (MIRENA) 20 MCG/DAY IUD 1 each by Intrauterine route once      Calcium Ascorbate (VITAMIN C) 500 mg tablet Take 1 tablet (500 mg total) by mouth daily (Patient not taking: Reported on 7/19/2024) 90 tablet 3    Cholecalciferol (Vitamin D) 50 MCG (2000 UT) CAPS Take 2 capsules (4,000 Units total) by mouth daily (Patient not taking: Reported on 7/19/2024) 180 capsule 3    vitamin B-12 (VITAMIN B-12) 1,000 mcg tablet TAKE ONE TABLET BY MOUTH EVERY DAY (Patient not taking: Reported on 7/19/2024) 90 tablet 0    [DISCONTINUED] EPINEPHrine (EPIPEN) 0.3 mg/0.3 mL SOAJ Inject 0.3 mL (0.3 mg total) into a muscle once for 1 dose 2 each 1     [DISCONTINUED] FeroSul 325 (65 Fe) MG tablet TAKE ONE TABLET BY MOUTH EVERY OTHER DAY (Patient not taking: Reported on 7/19/2024) 90 tablet 0    [DISCONTINUED] Multiple Vitamins-Minerals (multivitamin with minerals) tablet TAKE ONE TABLET BY MOUTH EVERY DAY (Patient not taking: Reported on 7/19/2024) 90 tablet 0     No current facility-administered medications on file prior to visit.        Objective     /82 (BP Location: Left arm, Patient Position: Sitting, Cuff Size: Standard)   Pulse 86   Temp 98 °F (36.7 °C)   Wt 126 kg (277 lb)   SpO2 97%   BMI 40.91 kg/m²     Physical Exam  Vitals reviewed.   Constitutional:       General: She is not in acute distress.     Appearance: Normal appearance. She is obese.   HENT:      Head: Normocephalic and atraumatic.      Right Ear: Tympanic membrane normal.      Left Ear: Tympanic membrane normal.      Mouth/Throat:      Mouth: Mucous membranes are moist.   Eyes:      Conjunctiva/sclera: Conjunctivae normal.   Cardiovascular:      Rate and Rhythm: Normal rate and regular rhythm.      Heart sounds: No murmur heard.  Pulmonary:      Effort: Pulmonary effort is normal.      Breath sounds: No wheezing or rales.   Abdominal:      General: Abdomen is flat. Bowel sounds are normal.      Palpations: Abdomen is soft.      Tenderness: There is no abdominal tenderness.   Musculoskeletal:      Cervical back: Neck supple.      Right lower leg: No edema.      Left lower leg: No edema.   Neurological:      Mental Status: She is alert. Mental status is at baseline.   Psychiatric:         Mood and Affect: Mood normal.         Behavior: Behavior normal.         Results for orders placed or performed in visit on 06/29/24   CBC and differential   Result Value Ref Range    WBC 7.15 4.31 - 10.16 Thousand/uL    RBC 4.59 3.81 - 5.12 Million/uL    Hemoglobin 13.3 11.5 - 15.4 g/dL    Hematocrit 41.3 34.8 - 46.1 %    MCV 90 82 - 98 fL    MCH 29.0 26.8 - 34.3 pg    MCHC 32.2 31.4 - 37.4 g/dL     RDW 13.5 11.6 - 15.1 %    MPV 12.5 8.9 - 12.7 fL    Platelets 246 149 - 390 Thousands/uL    nRBC 0 /100 WBCs    Segmented % 57 43 - 75 %    Immature Grans % 0 0 - 2 %    Lymphocytes % 33 14 - 44 %    Monocytes % 7 4 - 12 %    Eosinophils Relative 2 0 - 6 %    Basophils Relative 1 0 - 1 %    Absolute Neutrophils 4.12 1.85 - 7.62 Thousands/µL    Absolute Immature Grans 0.03 0.00 - 0.20 Thousand/uL    Absolute Lymphocytes 2.34 0.60 - 4.47 Thousands/µL    Absolute Monocytes 0.51 0.17 - 1.22 Thousand/µL    Eosinophils Absolute 0.11 0.00 - 0.61 Thousand/µL    Basophils Absolute 0.04 0.00 - 0.10 Thousands/µL   Comprehensive metabolic panel   Result Value Ref Range    Sodium 139 135 - 147 mmol/L    Potassium 4.1 3.5 - 5.3 mmol/L    Chloride 107 96 - 108 mmol/L    CO2 27 21 - 32 mmol/L    ANION GAP 5 4 - 13 mmol/L    BUN 10 5 - 25 mg/dL    Creatinine 0.56 (L) 0.60 - 1.30 mg/dL    Glucose, Fasting 100 (H) 65 - 99 mg/dL    Calcium 8.9 8.4 - 10.2 mg/dL    AST 11 (L) 13 - 39 U/L    ALT 9 7 - 52 U/L    Alkaline Phosphatase 95 34 - 104 U/L    Total Protein 6.3 (L) 6.4 - 8.4 g/dL    Albumin 3.8 3.5 - 5.0 g/dL    Total Bilirubin 0.85 0.20 - 1.00 mg/dL    eGFR 108 ml/min/1.73sq m   Lipid Panel with Direct LDL reflex   Result Value Ref Range    Cholesterol 152 See Comment mg/dL    Triglycerides 80 See Comment mg/dL    HDL, Direct 47 (L) >=50 mg/dL    LDL Calculated 89 0 - 100 mg/dL   Hemoglobin A1C   Result Value Ref Range    Hemoglobin A1C 6.1 (H) Normal 4.0-5.6%; PreDiabetic 5.7-6.4%; Diabetic >=6.5%; Glycemic control for adults with diabetes <7.0% %     mg/dl   Vitamin D 25 hydroxy   Result Value Ref Range    Vit D, 25-Hydroxy 17.8 (L) 30.0 - 100.0 ng/mL   Folate   Result Value Ref Range    Folate 12.4 >5.9 ng/mL   Vitamin B12   Result Value Ref Range    Vitamin B-12 236 180 - 914 pg/mL   Zinc   Result Value Ref Range    Zinc 64 44 - 115 ug/dL   Copper Level   Result Value Ref Range    Copper 106 80 - 158 ug/dL   TIBC Panel  (incl. Iron, TIBC, % Iron Saturation)   Result Value Ref Range    Iron Saturation 44 15 - 50 %    TIBC 341 250 - 450 ug/dL    Iron 149 50 - 212 ug/dL    UIBC 192 155 - 355 ug/dL   Ferritin   Result Value Ref Range    Ferritin 35 11 - 307 ng/mL

## 2024-07-19 NOTE — PATIENT INSTRUCTIONS
Vitamin D for 8 weeks, then take OTC vitamin D3 2,000 units per day  Blood work in 6 months for vitamin D  Chewable multivitamin ordered  Epi-pen  Return in 1 year

## 2024-08-07 ENCOUNTER — HOSPITAL ENCOUNTER (OUTPATIENT)
Dept: RADIOLOGY | Age: 54
Discharge: HOME | End: 2024-08-07
Payer: COMMERCIAL

## 2024-08-07 DIAGNOSIS — Z12.31 ENCOUNTER FOR SCREENING MAMMOGRAM FOR MALIGNANT NEOPLASM OF BREAST: ICD-10-CM

## 2024-08-07 PROCEDURE — 77063 BREAST TOMOSYNTHESIS BI: CPT

## 2024-08-07 PROCEDURE — 77067 SCR MAMMO BI INCL CAD: CPT

## 2024-09-06 ENCOUNTER — TELEPHONE (OUTPATIENT)
Age: 54
End: 2024-09-06

## 2024-09-06 NOTE — TELEPHONE ENCOUNTER
Pt calling because she couldn't find her prep instructions. Advised pt they were under the letters in her mychart. Pt was able to find the prep instructions

## 2024-09-11 ENCOUNTER — ANESTHESIA (OUTPATIENT)
Dept: ANESTHESIOLOGY | Facility: HOSPITAL | Age: 54
End: 2024-09-11

## 2024-09-11 ENCOUNTER — ANESTHESIA EVENT (OUTPATIENT)
Dept: ANESTHESIOLOGY | Facility: HOSPITAL | Age: 54
End: 2024-09-11

## 2024-09-11 ENCOUNTER — TELEPHONE (OUTPATIENT)
Age: 54
End: 2024-09-11

## 2024-09-11 NOTE — TELEPHONE ENCOUNTER
Contacted patient, no answer, LMOM-  I am calling from Dr Mantilla's office. I am calling to remind you of your upcoming colonoscopy on 9/25/24, and confirm you have your bowel prep instructions. If advised to hold certain medications prior to procedure, please remember to do so.     You will still receive a confirmation call the day prior to your appointment from the facility with your arrival time.     If you need to reschedule or do not have your instructions please call our office at [phone].     Pt declined a throat swab to test for strep.

## 2024-09-24 ENCOUNTER — ANESTHESIA EVENT (OUTPATIENT)
Dept: ANESTHESIOLOGY | Facility: HOSPITAL | Age: 54
End: 2024-09-24

## 2024-09-24 ENCOUNTER — ANESTHESIA (OUTPATIENT)
Dept: ANESTHESIOLOGY | Facility: HOSPITAL | Age: 54
End: 2024-09-24

## 2024-09-24 NOTE — ANESTHESIA PREPROCEDURE EVALUATION
"Procedure:  PRE-OP ONLY    Relevant Problems   CARDIO   (+) Hyperlipidemia      HEMATOLOGY   (+) Anemia        Lab Results   Component Value Date    WBC 7.15 06/29/2024    HGB 13.3 06/29/2024    HCT 41.3 06/29/2024    MCV 90 06/29/2024     06/29/2024     Lab Results   Component Value Date    SODIUM 139 06/29/2024    K 4.1 06/29/2024     06/29/2024    CO2 27 06/29/2024    BUN 10 06/29/2024    CREATININE 0.56 (L) 06/29/2024    GLUC 97 11/12/2016    CALCIUM 8.9 06/29/2024     No results found for: \"INR\", \"PROTIME\"  Lab Results   Component Value Date    HGBA1C 6.1 (H) 06/29/2024               Anesthesia Plan  ASA Score- 2     Anesthesia Type- IV sedation with anesthesia with ASA Monitors.         Additional Monitors:     Airway Plan:            Plan Factors-    Chart reviewed.   Existing labs reviewed. Patient summary reviewed.                  Induction- intravenous.    Postoperative Plan-         Informed Consent-         "

## 2024-09-25 ENCOUNTER — ANESTHESIA EVENT (OUTPATIENT)
Dept: GASTROENTEROLOGY | Facility: AMBULARY SURGERY CENTER | Age: 54
End: 2024-09-25
Payer: COMMERCIAL

## 2024-09-25 ENCOUNTER — HOSPITAL ENCOUNTER (OUTPATIENT)
Dept: GASTROENTEROLOGY | Facility: AMBULARY SURGERY CENTER | Age: 54
Setting detail: OUTPATIENT SURGERY
Discharge: HOME | End: 2024-09-25
Attending: COLON & RECTAL SURGERY
Payer: COMMERCIAL

## 2024-09-25 VITALS
TEMPERATURE: 98.5 F | DIASTOLIC BLOOD PRESSURE: 65 MMHG | HEART RATE: 61 BPM | WEIGHT: 275 LBS | RESPIRATION RATE: 22 BRPM | OXYGEN SATURATION: 100 % | HEIGHT: 69 IN | SYSTOLIC BLOOD PRESSURE: 136 MMHG | BODY MASS INDEX: 40.73 KG/M2

## 2024-09-25 DIAGNOSIS — Z87.19 HISTORY OF DIVERTICULITIS: ICD-10-CM

## 2024-09-25 LAB
EXT PREGNANCY TEST URINE: NEGATIVE
EXT. CONTROL: NORMAL

## 2024-09-25 PROCEDURE — 81025 URINE PREGNANCY TEST: CPT | Performed by: COLON & RECTAL SURGERY

## 2024-09-25 PROCEDURE — G0121 COLON CA SCRN NOT HI RSK IND: HCPCS | Performed by: COLON & RECTAL SURGERY

## 2024-09-25 RX ORDER — PROPOFOL 10 MG/ML
INJECTION, EMULSION INTRAVENOUS AS NEEDED
Status: DISCONTINUED | OUTPATIENT
Start: 2024-09-25 | End: 2024-09-25

## 2024-09-25 RX ORDER — SODIUM CHLORIDE, SODIUM LACTATE, POTASSIUM CHLORIDE, CALCIUM CHLORIDE 600; 310; 30; 20 MG/100ML; MG/100ML; MG/100ML; MG/100ML
INJECTION, SOLUTION INTRAVENOUS CONTINUOUS PRN
Status: DISCONTINUED | OUTPATIENT
Start: 2024-09-25 | End: 2024-09-25

## 2024-09-25 RX ADMIN — PROPOFOL 50 MG: 10 INJECTION, EMULSION INTRAVENOUS at 09:57

## 2024-09-25 RX ADMIN — PROPOFOL 90 MG: 10 INJECTION, EMULSION INTRAVENOUS at 09:53

## 2024-09-25 RX ADMIN — PROPOFOL 50 MG: 10 INJECTION, EMULSION INTRAVENOUS at 10:02

## 2024-09-25 RX ADMIN — PROPOFOL 50 MG: 10 INJECTION, EMULSION INTRAVENOUS at 10:07

## 2024-09-25 RX ADMIN — PROPOFOL 50 MG: 10 INJECTION, EMULSION INTRAVENOUS at 09:59

## 2024-09-25 RX ADMIN — SODIUM CHLORIDE, SODIUM LACTATE, POTASSIUM CHLORIDE, AND CALCIUM CHLORIDE: .6; .31; .03; .02 INJECTION, SOLUTION INTRAVENOUS at 09:46

## 2024-09-25 RX ADMIN — PROPOFOL 110 MG: 10 INJECTION, EMULSION INTRAVENOUS at 09:50

## 2024-09-25 RX ADMIN — PROPOFOL 50 MG: 10 INJECTION, EMULSION INTRAVENOUS at 09:55

## 2024-09-25 NOTE — ANESTHESIA POSTPROCEDURE EVALUATION
Post-Op Assessment Note    CV Status:  Stable    Pain management: adequate       Mental Status:  Alert and awake   Hydration Status:  Euvolemic   PONV Controlled:  Controlled   Airway Patency:  Patent     Post Op Vitals Reviewed: Yes    No anethesia notable event occurred.    Staff: Anesthesiologist, CRNA               BP   130/68   Temp 98   Pulse 77   Resp 15   SpO2 100

## 2024-09-25 NOTE — H&P
History and Physical   Colon and Rectal Surgery   Sonia Aguilar 54 y.o. female MRN: [id number]  Unit/Bed#:  Encounter: [id number]  09/25/24   9:41 AM      CC:  History of diverticulitis    History of Present Illness   HPI:  Sonia Aguilar is a 54 y.o. female for diagnostic testing.  Historical Information   Past Medical History:   Diagnosis Date    Anemia     Basal cell carcinoma      Left forearm 4/01/2019    Diverticulitis of colon     Hypertension     Iron deficiency     Long term use of drug     resolved 11/27/15    PONV (postoperative nausea and vomiting)     Pt requests to be pre-medicated for N/V prior to sx    Shingles     Umbilical hernia     Varicella      Past Surgical History:   Procedure Laterality Date    CHOLECYSTECTOMY  06/11/2021    GASTRIC BYPASS  03/29/2011    May-en-Y bypass    HERNIA REPAIR  06/11/2021    KNEE SURGERY Left     arthroscopic knee surgery    RI LAPAROSCOPY SURG CHOLECYSTECTOMY N/A 05/21/2021    Procedure: LAPAROSCOPIC CHOLECYSTECTOMY;  Surgeon: Milton Felton MD;  Location: [facility];  Service: General    RI RPR UMBILICAL HRNA 5 YRS/> REDUCIBLE N/A 05/21/2021    Procedure: UMBILICAL HERNIA REPAIR, REMOVAL OF UMBILICUS;  Surgeon: Milton Felton MD;  Location: [facility];  Service: General    TONSILLECTOMY         Meds/Allergies     Not in a hospital admission.      Current Outpatient Medications:     Calcium Ascorbate (VITAMIN C) 500 mg tablet, Take 1 tablet (500 mg total) by mouth daily, Disp: 90 tablet, Rfl: 3    Cholecalciferol (Vitamin D) 50 MCG (2000 UT) CAPS, Take 2 capsules (4,000 Units total) by mouth daily, Disp: 180 capsule, Rfl: 3    ergocalciferol (VITAMIN D2) 50,000 units, Take 1 capsule (50,000 Units total) by mouth once a week for 8 doses, Disp: 8 capsule, Rfl: 0    Levonorgestrel (MIRENA) 20 MCG/DAY IUD, 1 each by Intrauterine route once, Disp: , Rfl:     Multiple Vitamins-Minerals (Multivitamin Adult) CHEW, Chew 1 tablet in the morning, Disp: 90 tablet, Rfl: 1     "vitamin B-12 (VITAMIN B-12) 1,000 mcg tablet, TAKE ONE TABLET BY MOUTH EVERY DAY, Disp: 90 tablet, Rfl: 0    EPINEPHrine (EPIPEN) 0.3 mg/0.3 mL SOAJ, Inject 0.3 mL (0.3 mg total) into a muscle once for 1 dose, Disp: 2 each, Rfl: 1    Allergies   Allergen Reactions    Peach Flavor - Food Allergy Anaphylaxis and Facial Swelling         Social History   Social History     Substance and Sexual Activity   Alcohol Use No    Comment: stopped drinking alcohol     Social History     Substance and Sexual Activity   Drug Use No     Social History     Tobacco Use   Smoking Status Never   Smokeless Tobacco Never         Family History:   Family History   Problem Relation Age of Onset    No Known Problems Mother     Autoimmune disease Father     Cancer Maternal Grandmother 79        unknown type    No Known Problems Maternal Grandfather     No Known Problems Paternal Grandmother     No Known Problems Paternal Grandfather     No Known Problems Maternal Aunt     No Known Problems Maternal Aunt     No Known Problems Maternal Aunt     No Known Problems Maternal Aunt     No Known Problems Maternal Aunt     No Known Problems Maternal Aunt     No Known Problems Maternal Aunt     No Known Problems Maternal Aunt     No Known Problems Maternal Aunt     No Known Problems Maternal Aunt     No Known Problems Paternal Aunt     Heart disease Neg Hx          Objective     Current Vitals:   Blood Pressure: 133/78 (09/25/24 0859)  Pulse: 74 (09/25/24 0859)  Temperature: 97.9 °F (36.6 °C) (09/25/24 0859)  Temp Source: Temporal (09/25/24 0859)  Respirations: 18 (09/25/24 0859)  Height: 5' 9\" (175.3 cm) (09/25/24 0859)  Weight - Scale: 125 kg (275 lb) (09/25/24 0859)  SpO2: 98 % (09/25/24 0859)  No intake or output data in the 24 hours ending 09/25/24 0941    Physical Exam:  General:  Well nourished, no distress.  Neuro: Alert and oriented  Eyes:Sclera anicteric, conjunctiva pink.  Pulm: Clear to auscultation bilaterally. No respiratory Distress. "   CV:  Regular rate and rhythm. No murmurs.  Abdomen:  Soft, flat, non-tender, without masses or hepatosplenomegaly.    Lab Results:       ASSESSMENT:  Sonia Aguilar is a 54 y.o. female for diagnostic testing.  PLAN:  Colonoscopy.  Risks , including, but not limited to, bleeding, perforation, missed lesions, and potential need for surgery, were reviewed. Alternatives to colonoscopy were discussed.  LUDMILA Mantilla MD

## 2024-09-25 NOTE — ANESTHESIA PREPROCEDURE EVALUATION
"Procedure:  COLONOSCOPY    Relevant Problems   ANESTHESIA   (+) PONV (postoperative nausea and vomiting) (Resolved)      CARDIO   (+) Hyperlipidemia      ENDO (within normal limits)      GI/HEPATIC (within normal limits)      /RENAL (within normal limits)      HEMATOLOGY   (+) Anemia      NEURO/PSYCH (within normal limits)      PULMONARY (within normal limits)      Surgery/Wound/Pain   (+) Status post gastric bypass for obesity      FEN/Gastrointestinal   (+) Diverticulosis      Other   (+) Pre-diabetes        Lab Results   Component Value Date    WBC 7.15 06/29/2024    HGB 13.3 06/29/2024    HCT 41.3 06/29/2024    MCV 90 06/29/2024     06/29/2024     Lab Results   Component Value Date    SODIUM 139 06/29/2024    K 4.1 06/29/2024     06/29/2024    CO2 27 06/29/2024    BUN 10 06/29/2024    CREATININE 0.56 (L) 06/29/2024    GLUC 97 11/12/2016    CALCIUM 8.9 06/29/2024     No results found for: \"INR\", \"PROTIME\"  Lab Results   Component Value Date    HGBA1C 6.1 (H) 06/29/2024          Physical Exam    Airway    Mallampati score: I  TM Distance: >3 FB  Neck ROM: full     Dental       Cardiovascular  Cardiovascular exam normal    Pulmonary  Pulmonary exam normal     Other Findings  post-pubertal.      Anesthesia Plan  ASA Score- 2     Anesthesia Type- IV sedation with anesthesia with ASA Monitors.         Additional Monitors:     Airway Plan:            Plan Factors-Exercise tolerance (METS): >4 METS.    Chart reviewed.   Existing labs reviewed. Patient summary reviewed.                  Induction- intravenous.    Postoperative Plan-         Informed Consent- Anesthetic plan and risks discussed with patient.  I personally reviewed this patient with the CRNA. Discussed and agreed on the Anesthesia Plan with the CRNA..        "

## 2024-10-14 ENCOUNTER — TELEPHONE (OUTPATIENT)
Age: 54
End: 2024-10-14

## 2024-10-14 NOTE — TELEPHONE ENCOUNTER
Patient is calling to see whether the IUD would be covered by insurance. She was told due to age sometimes its not. Please reach out to her.

## 2024-10-16 NOTE — TELEPHONE ENCOUNTER
Phone call to patient she is  interested in having IUD replaced. Will need to verify insurance to check for eligibility.

## 2024-11-05 DIAGNOSIS — N88.2 CERVICAL STENOSIS (UTERINE CERVIX): Primary | ICD-10-CM

## 2024-11-05 RX ORDER — MISOPROSTOL 200 UG/1
400 TABLET ORAL ONCE
Qty: 2 TABLET | Refills: 0 | Status: SHIPPED | OUTPATIENT
Start: 2024-11-05 | End: 2024-11-14 | Stop reason: ALTCHOICE

## 2024-11-14 ENCOUNTER — PROCEDURE VISIT (OUTPATIENT)
Dept: OBGYN CLINIC | Facility: CLINIC | Age: 54
End: 2024-11-14
Payer: COMMERCIAL

## 2024-11-14 VITALS
WEIGHT: 287.6 LBS | BODY MASS INDEX: 41.17 KG/M2 | DIASTOLIC BLOOD PRESSURE: 78 MMHG | HEIGHT: 70 IN | SYSTOLIC BLOOD PRESSURE: 130 MMHG

## 2024-11-14 DIAGNOSIS — Z87.42 HISTORY OF MENORRHAGIA: ICD-10-CM

## 2024-11-14 DIAGNOSIS — Z30.433 ENCOUNTER FOR REMOVAL AND REINSERTION OF IUD: Primary | ICD-10-CM

## 2024-11-14 DIAGNOSIS — Z30.430 ENCOUNTER FOR INSERTION OF MIRENA IUD: ICD-10-CM

## 2024-11-14 PROCEDURE — 58300 INSERT INTRAUTERINE DEVICE: CPT | Performed by: OBSTETRICS & GYNECOLOGY

## 2024-11-14 PROCEDURE — 58301 REMOVE INTRAUTERINE DEVICE: CPT | Performed by: OBSTETRICS & GYNECOLOGY

## 2024-11-14 NOTE — PROGRESS NOTES
Name: Sonia Aguilar      : 1970      MRN: [id number]  Encounter Provider: Gisella Barney DO  Encounter Date: 2024   Encounter department: [facility]  :  Assessment & Plan  Encounter for removal and reinsertion of IUD         Encounter for insertion of Mirena IUD         History of menorrhagia         Mirena IUD removed due to expiration, followed by replacement of second Mirena IUD.  Well-tolerated.  Recommend pelvic rest 1 week. We discussed signs and symptoms of perimenopause transitioning to menopause.  Recent FSH level 7.0.  Return to office in 5 weeks for IUD follow-up as well as GYN exam.    History of Present Illness   HPI  Sonia Aguilar is a 54 y.o. female who presents     This is a pleasant 54-year-old female G0 presents for Mirena IUD removal,  10/2024.  She has had a long history of menorrhagia and has been amenorrheic with the Mirena IUD.  Recent labs reflect that she is not menopausal and she desires a second Mirena IUD.  She did take Cytotec last evening as well as Motrin prior to office visit.            History obtained from: patient  Review of Systems   Constitutional:  Negative for fatigue, fever and unexpected weight change.   Respiratory:  Negative for cough, chest tightness, shortness of breath and wheezing.    Cardiovascular: Negative.  Negative for chest pain and palpitations.   Gastrointestinal: Negative.  Negative for abdominal distention, abdominal pain, blood in stool, constipation, diarrhea, nausea and vomiting.   Genitourinary: Negative.  Negative for difficulty urinating, dyspareunia, dysuria, flank pain, frequency, genital sores, hematuria, pelvic pain, urgency, vaginal bleeding, vaginal discharge and vaginal pain.   Skin:  Negative for rash.     Current Outpatient Medications on File Prior to Visit   Medication Sig Dispense Refill    Calcium Ascorbate (VITAMIN C) 500 mg tablet Take 1 tablet (500 mg total) by mouth daily 90 tablet 3     "Cholecalciferol (Vitamin D) 50 MCG (2000 UT) CAPS Take 2 capsules (4,000 Units total) by mouth daily 180 capsule 3    Ferrous Sulfate (IRON PO) Take by mouth      Levonorgestrel (MIRENA) 20 MCG/DAY IUD 1 each by Intrauterine route once      Multiple Vitamins-Minerals (Multivitamin Adult) CHEW Chew 1 tablet in the morning 90 tablet 1    vitamin B-12 (VITAMIN B-12) 1,000 mcg tablet TAKE ONE TABLET BY MOUTH EVERY DAY 90 tablet 0    EPINEPHrine (EPIPEN) 0.3 mg/0.3 mL SOAJ Inject 0.3 mL (0.3 mg total) into a muscle once for 1 dose 2 each 1    ergocalciferol (VITAMIN D2) 50,000 units Take 1 capsule (50,000 Units total) by mouth once a week for 8 doses 8 capsule 0    [DISCONTINUED] miSOPROStol (Cytotec) 200 mcg tablet Take 2 tablets (400 mcg total) by mouth 1 (one) time for 1 dose TAKE BOTH TABLETS NIGHT PRIOR TO PROCDEDURE 2 tablet 0     No current facility-administered medications on file prior to visit.         Objective   /78   Ht 5' 9.5\" (1.765 m)   Wt 130 kg (287 lb 9.6 oz)   BMI 41.86 kg/m²      Physical Exam      IUD Procedure    Date/Time: 11/14/2024 12:59 PM    Performed by: Gisella Barney DO  Authorized by: Gisella Barney, DO    Verbal consent obtained?: Yes    Risks and benefits: Risks, benefits and alternatives were discussed    Consent given by:  Patient  Patient identity confirmed:  Verbally with patient  Select procedure: IUD removal    IUD Removal:     Removed without complications: yes      Strings visualized: yes      IUD intact: yes    IUD Procedure    Date/Time: 11/14/2024 12:59 PM    Performed by: Gisella Barney DO  Authorized by: Gisella Barney, DO    Verbal consent obtained?: Yes    Risks and benefits: Risks, benefits and alternatives were discussed    Consent given by:  Patient  Patient states understanding of procedure being performed: Yes    Patient identity confirmed:  Verbally with patient  Select procedure: IUD insertion    IUD Insertion:     Pelvic exam performed: yes      " Cervix cleaned and prepped: yes      Speculum placed in vagina: yes      Allis applied to cervix: yes      IUD inserted with no complications: yes      Strings trimmed: yes      Uterus sounded: yes      Uterus sound depth (cm):  7.5    IUD type:  Levonorgestrel 20 MCG/DAY      Administrative Statements   I have spent a total time of 30 minutes in caring for this patient on the day of the visit/encounter including Diagnostic results, Prognosis, Risks and benefits of tx options, Instructions for management, Impressions, Counseling / Coordination of care, Documenting in the medical record, Reviewing / ordering tests, medicine, procedures  , and Obtaining or reviewing history  .

## 2025-01-16 ENCOUNTER — ANNUAL EXAM (OUTPATIENT)
Dept: OBGYN CLINIC | Facility: CLINIC | Age: 55
End: 2025-01-16
Payer: COMMERCIAL

## 2025-01-16 VITALS
BODY MASS INDEX: 41.8 KG/M2 | HEIGHT: 70 IN | DIASTOLIC BLOOD PRESSURE: 80 MMHG | SYSTOLIC BLOOD PRESSURE: 128 MMHG | WEIGHT: 292 LBS

## 2025-01-16 DIAGNOSIS — Z12.31 ENCOUNTER FOR SCREENING MAMMOGRAM FOR MALIGNANT NEOPLASM OF BREAST: ICD-10-CM

## 2025-01-16 DIAGNOSIS — Z97.5 PRESENCE OF IUD: ICD-10-CM

## 2025-01-16 DIAGNOSIS — Z30.431 IUD CHECK UP: ICD-10-CM

## 2025-01-16 DIAGNOSIS — Z01.419 ENCOUNTER FOR ANNUAL ROUTINE GYNECOLOGICAL EXAMINATION: Primary | ICD-10-CM

## 2025-01-16 PROCEDURE — S0612 ANNUAL GYNECOLOGICAL EXAMINA: HCPCS | Performed by: OBSTETRICS & GYNECOLOGY

## 2025-01-16 PROCEDURE — G0145 SCR C/V CYTO,THINLAYER,RESCR: HCPCS | Performed by: OBSTETRICS & GYNECOLOGY

## 2025-01-16 NOTE — PROGRESS NOTES
Name: Sonia Aguilar      : 1970      MRN: [id number]  Encounter Provider: Gisella Barney DO  Encounter Date: 2025   Encounter department: [facility]  :  Assessment & Plan  Encounter for annual routine gynecological examination         Encounter for screening mammogram for malignant neoplasm of breast    Orders:    Mammo screening bilateral w 3d and cad; Future    Presence of IUD         IUD check up         Pap smear done as well as annual.  Encouraged self breast examination as well as calcium supplementation.  Continue annual mammogram.  Reviewed colon cancer screening, up to date.  Reviewed Mirena IUD effective 8 consecutive years.  Discussed we will reevaluate each year regarding menopausal symptoms.  All questions answered.  She will continue to follow-up with primary care as scheduled.  Return to office in 1 year or as needed    History of Present Illness   HPI  Sonia Aguilar is a 54 y.o. female who presents     This is a very pleasant 54-year-old female G0 presents for her GYN exam.  She had her third Mirena IUD inserted 2024.  She has been amenorrheic.  She denies any hot flashes or night sweats.  No changes in bowel or bladder function.  She has been in a monogamous relationship with her  for over 29 years.  Pap smears have been normal.    She does follow-up with her family physician on a regular basis.    Colon 2018 f/u 10 yr     Mammo 2024    Pap     Mirena insert 2024        History obtained from: patient    Review of Systems   Constitutional:  Negative for fatigue, fever and unexpected weight change.   Respiratory:  Negative for cough, chest tightness, shortness of breath and wheezing.    Cardiovascular: Negative.  Negative for chest pain and palpitations.   Gastrointestinal: Negative.  Negative for abdominal distention, abdominal pain, blood in stool, constipation, diarrhea, nausea and vomiting.   Genitourinary: Negative.  Negative for difficulty  "urinating, dyspareunia, dysuria, flank pain, frequency, genital sores, hematuria, pelvic pain, urgency, vaginal bleeding, vaginal discharge and vaginal pain.   Skin:  Negative for rash.     Current Outpatient Medications on File Prior to Visit   Medication Sig Dispense Refill    BIOTIN PO Take by mouth      Calcium Ascorbate (VITAMIN C) 500 mg tablet Take 1 tablet (500 mg total) by mouth daily 90 tablet 3    Cholecalciferol (Vitamin D) 50 MCG (2000 UT) CAPS Take 2 capsules (4,000 Units total) by mouth daily 180 capsule 3    Ferrous Sulfate (IRON PO) Take by mouth      Levonorgestrel (MIRENA) 20 MCG/DAY IUD 1 each by Intrauterine route once      Multiple Vitamins-Minerals (Multivitamin Adult) CHEW Chew 1 tablet in the morning 90 tablet 1    TURMERIC PO Take by mouth      vitamin B-12 (VITAMIN B-12) 1,000 mcg tablet TAKE ONE TABLET BY MOUTH EVERY DAY 90 tablet 0    EPINEPHrine (EPIPEN) 0.3 mg/0.3 mL SOAJ Inject 0.3 mL (0.3 mg total) into a muscle once for 1 dose 2 each 1    ergocalciferol (VITAMIN D2) 50,000 units Take 1 capsule (50,000 Units total) by mouth once a week for 8 doses 8 capsule 0     No current facility-administered medications on file prior to visit.         Objective   /80   Ht 5' 9.5\" (1.765 m)   Wt 132 kg (292 lb)   BMI 42.50 kg/m²      Physical Exam  Constitutional:       Appearance: Normal appearance. She is well-developed.   HENT:      Head: Normocephalic and atraumatic.   Cardiovascular:      Rate and Rhythm: Normal rate and regular rhythm.   Pulmonary:      Effort: Pulmonary effort is normal.      Breath sounds: Normal breath sounds.   Chest:   Breasts:     Right: No inverted nipple, mass, nipple discharge, skin change or tenderness.      Left: No inverted nipple, mass, nipple discharge, skin change or tenderness.   Abdominal:      General: Bowel sounds are normal. There is no distension.      Palpations: Abdomen is soft.      Tenderness: There is no abdominal tenderness. There is no " guarding or rebound.   Genitourinary:     Labia:         Right: No rash, tenderness or lesion.         Left: No rash, tenderness or lesion.       Vagina: Normal. No signs of injury. No vaginal discharge or tenderness.      Cervix: No cervical motion tenderness, discharge, friability, lesion or cervical bleeding.      Uterus: Not enlarged and not tender.       Adnexa:         Right: No mass, tenderness or fullness.          Left: No mass, tenderness or fullness.     Neurological:      Mental Status: She is alert.   Psychiatric:         Behavior: Behavior normal.     The vagina is well estrogenized.  Cervix is small nulliparous.  IUD string is visualized today.    Administrative Statements   I have spent a total time of 25 minutes in caring for this patient on the day of the visit/encounter including Instructions for management, Impressions, Counseling / Coordination of care, Documenting in the medical record, Reviewing / ordering tests, medicine, procedures  , and Obtaining or reviewing history  .

## 2025-01-22 LAB
LAB AP GYN PRIMARY INTERPRETATION: NORMAL
Lab: NORMAL

## 2025-02-06 ENCOUNTER — OFFICE VISIT (OUTPATIENT)
Dept: OBGYN CLINIC | Facility: HOSPITAL | Age: 55
End: 2025-02-06
Payer: COMMERCIAL

## 2025-02-06 ENCOUNTER — HOSPITAL ENCOUNTER (OUTPATIENT)
Dept: RADIOLOGY | Facility: HOSPITAL | Age: 55
Discharge: HOME | End: 2025-02-06
Attending: SURGERY
Payer: COMMERCIAL

## 2025-02-06 VITALS — WEIGHT: 290 LBS | BODY MASS INDEX: 41.52 KG/M2 | HEIGHT: 70 IN

## 2025-02-06 DIAGNOSIS — M79.641 PAIN IN BOTH HANDS: ICD-10-CM

## 2025-02-06 DIAGNOSIS — M79.642 PAIN IN BOTH HANDS: ICD-10-CM

## 2025-02-06 DIAGNOSIS — M18.0 ARTHRITIS OF CARPOMETACARPAL (CMC) JOINT OF BOTH THUMBS: Primary | ICD-10-CM

## 2025-02-06 DIAGNOSIS — G56.03 CARPAL TUNNEL SYNDROME, BILATERAL: ICD-10-CM

## 2025-02-06 PROCEDURE — 20600 DRAIN/INJ JOINT/BURSA W/O US: CPT | Performed by: SURGERY

## 2025-02-06 PROCEDURE — 99204 OFFICE O/P NEW MOD 45 MIN: CPT | Performed by: SURGERY

## 2025-02-06 PROCEDURE — 73130 X-RAY EXAM OF HAND: CPT

## 2025-02-06 RX ORDER — BUPIVACAINE HYDROCHLORIDE 2.5 MG/ML
0.5 INJECTION, SOLUTION INFILTRATION; PERINEURAL
Status: COMPLETED | OUTPATIENT
Start: 2025-02-06 | End: 2025-02-06

## 2025-02-06 RX ORDER — TRIAMCINOLONE ACETONIDE 40 MG/ML
20 INJECTION, SUSPENSION INTRA-ARTICULAR; INTRAMUSCULAR
Status: COMPLETED | OUTPATIENT
Start: 2025-02-06 | End: 2025-02-06

## 2025-02-06 RX ORDER — LIDOCAINE HYDROCHLORIDE 10 MG/ML
1 INJECTION, SOLUTION INFILTRATION; PERINEURAL
Status: COMPLETED | OUTPATIENT
Start: 2025-02-06 | End: 2025-02-06

## 2025-02-06 RX ADMIN — TRIAMCINOLONE ACETONIDE 20 MG: 40 INJECTION, SUSPENSION INTRA-ARTICULAR; INTRAMUSCULAR at 13:00

## 2025-02-06 RX ADMIN — BUPIVACAINE HYDROCHLORIDE 0.5 ML: 2.5 INJECTION, SOLUTION INFILTRATION; PERINEURAL at 13:00

## 2025-02-06 RX ADMIN — LIDOCAINE HYDROCHLORIDE 1 ML: 10 INJECTION, SOLUTION INFILTRATION; PERINEURAL at 13:00

## 2025-02-06 NOTE — ASSESSMENT & PLAN NOTE
Bilateral hand x-rays were performed in the office and reviewed. The etiology of CMC arthritis was discussed along with treatment options. We discussed comfort cool braces, the use of topical antiinflammatories, such as Voltaren gel as well as the possibility of CMC CSI's. If she was to fail nonoperative management, a CMC arthroplasty me be discussed. Bilateral thumb CMC CSI's were performed in the office without complication. Post injection protocol/expectations were reviewed. The CSI's may be repeated on a 3 month basis as needed.   Orders:    XR hand 3+ vw left; Future    XR hand 3+ vw right; Future    Small joint arthrocentesis: bilateral thumb CMC

## 2025-02-06 NOTE — PROGRESS NOTES
Debra Wayne M.D.  Attending, Orthopaedic Surgery  Hand, Wrist, and Elbow Surgery  [facility]      ORTHOPAEDIC HAND, WRIST, AND ELBOW OFFICE  VISIT       ASSESSMENT/PLAN:        Assessment & Plan  Arthritis of carpometacarpal (CMC) joint of both thumbs  Bilateral hand x-rays were performed in the office and reviewed. The etiology of CMC arthritis was discussed along with treatment options. We discussed comfort cool braces, the use of topical antiinflammatories, such as Voltaren gel as well as the possibility of CMC CSI's. If she was to fail nonoperative management, a CMC arthroplasty me be discussed. Bilateral thumb CMC CSI's were performed in the office without complication. Post injection protocol/expectations were reviewed. The CSI's may be repeated on a 3 month basis as needed.   Orders:    XR hand 3+ vw left; Future    XR hand 3+ vw right; Future    Small joint arthrocentesis: bilateral thumb CMC    Carpal tunnel syndrome, bilateral  The etiology of carpal tunnel syndrome was discussed along with treatment options. She has tried bracing, which did make her symptoms worse. US of bilateral wrists were ordered to further evaluate for carpal tunnel syndrome. We discussed the US will not grade severity of compression. We discussed the possibility of carpal tunnel CSI's vs a carpal tunnel release.   Orders:    US Carpal Tunnel; Future    The patient verbalized understanding of exam findings and treatment plan. We engaged in the shared decision-making process and treatment options were discussed at length with the patient. Surgical and conservative management discussed today along with risks and benefits.    Diagnoses and all orders for this visit:    Arthritis of carpometacarpal (CMC) joint of both thumbs  -     XR hand 3+ vw left; Future  -     XR hand 3+ vw right; Future  -     Small joint arthrocentesis: bilateral thumb CMC    Carpal tunnel syndrome, bilateral  -     US Carpal Tunnel;  Future      Follow Up:  Return for fu after US is complete .    To Do Next Visit:  Re-evaluation of current issue      General Discussions:  CMC Arthritis: The anatomy and physiology of carpometacarpal joint arthritis was discussed with the patient today in the office.  Deterioration of the articular cartilage eventually leads to hypermobility at the thumb CMC joint, resulting in joint subluxation, osteophyte formation, cystic changes within the trapezium and base of the first metacarpal, as well as subchondral sclerosis.  Eventually, pain, limited mobility, and compensatory hyperextension at the metacarpophalangeal joint may develop.  While normal activity and usage of the thumb joint may provide a painful experience to the patient, this typically does not result in damage to the thumb or hand.  Treatment options include resting thumb spica splints to decreased joint edema, pain, and inflammation.  Therapy exercises to strengthen the thenar musculature may relieve pain, but do not alter the overall continued development of osteoarthritis.  Oral medications, topical medications, corticosteroid injections may decrease pain and increase overall function.  Eventually, approximately 5% of patients may require surgical intervention.                                                                                                                                                                                                 Carpal Tunnel Syndrome: The anatomy and physiology of carpal tunnel syndrome was discussed with the patient today.  Increase pressure localized under the transverse carpal ligament can cause pain, numbness, tingling, or dysesthesias within the median nerve distribution as well as feelings of fatigue, clumsiness, or awkwardness.  These symptoms typically occur at night and worse in the morning upon waking.  Eventually, untreated carpal tunnel syndrome can result in weakness and permanent loss of muscle  within the thenar compartment of the hand.  Treatment options were discussed with the patient.  Conservative treatment includes nocturnal resting splints to keep the nerve in a neutral position, ergonomic changes within the work or home environment, activity modification, and tendon gliding exercises. Vitamin B6 one tablet daily over the counter may helpful to reduce symptoms.   Steroid injections within the carpal canal can help a majority of patients, however this is often self-limited in a majority of patients.  Surgical intervention to divide the transverse carpal ligament typically results in a long-lasting relief of the patient's complaints, with the recurrence rate of less than 1%.                                                                                                                                                                                   ____________________________________________________________________________________________________________________________________________      CHIEF COMPLAINT:  Chief Complaint   Patient presents with    Left Hand - Numbness, Pain    Right Hand - Pain, Numbness       SUBJECTIVE:  Sonia Aguilar is a 54 y.o. year old RHD female who presents to the office for bilateral hand pain. She notes pain to bilateral hands. Pain is mainly to the base of her thumb and to her left palm, more so in line with the long finger. She feels her left hand is slightly worse then her right. Pain will worsen with use. She also notes numbness and tingling to both hands, mainly her long and ring fingers. She did try bracing but feels it made her symptoms worse. She has a history of gastric bypass and is not suppose to take NSAID's but has been taking Aleve as needed for pain control. She has tried topical creams as well as heat, without significant improvement in her symptoms. She denies any previous surgery. She feels her hands are cold at times and pain can radiate up the arms.  She feels she drops things secondary to pain as well as numbness and tingling.      Pain/symptom timing:  Worse during the day when active  Pain/symptom context:  Worse with activites and work  Pain/symptom modifying factors:  Rest makes better, activities make worse  Pain/symptom associated signs/symptoms: none    Prior treatment   NSAIDsYes   Injections No   Bracing/Orthotics Yes    Physical Therapy No     I have personally reviewed all the relevant PMH, PSH, SH, FH, Medications and allergies      PAST MEDICAL HISTORY:  Past Medical History:   Diagnosis Date    Anemia     Basal cell carcinoma      Left forearm 4/01/2019    Diverticulitis of colon     Hypertension     Iron deficiency     Long term use of drug     resolved 11/27/15    PONV (postoperative nausea and vomiting)     Pt requests to be pre-medicated for N/V prior to sx    Shingles     Umbilical hernia     Varicella        PAST SURGICAL HISTORY:  Past Surgical History:   Procedure Laterality Date    CHOLECYSTECTOMY  06/11/2021    GASTRIC BYPASS  03/29/2011    May-en-Y bypass    HERNIA REPAIR  06/11/2021    KNEE SURGERY Left     arthroscopic knee surgery    AL LAPAROSCOPY SURG CHOLECYSTECTOMY N/A 05/21/2021    Procedure: LAPAROSCOPIC CHOLECYSTECTOMY;  Surgeon: Milton Felton MD;  [facility];  Service: General    AL RPR UMBILICAL HRNA 5 YRS/> REDUCIBLE N/A 05/21/2021    Procedure: UMBILICAL HERNIA REPAIR, REMOVAL OF UMBILICUS;  Surgeon: Milton Felton MD;  Location: [facility];  Service: General    TONSILLECTOMY         FAMILY HISTORY:  Family History   Problem Relation Age of Onset    No Known Problems Mother     Autoimmune disease Father     Cancer Maternal Grandmother 79        unknown type    No Known Problems Maternal Grandfather     No Known Problems Paternal Grandmother     No Known Problems Paternal Grandfather     No Known Problems Maternal Aunt     No Known Problems Maternal Aunt     No Known Problems Maternal Aunt     No Known Problems  Maternal Aunt     No Known Problems Maternal Aunt     No Known Problems Maternal Aunt     No Known Problems Maternal Aunt     No Known Problems Maternal Aunt     No Known Problems Maternal Aunt     No Known Problems Maternal Aunt     No Known Problems Paternal Aunt     Heart disease Neg Hx        SOCIAL HISTORY:  Social History     Tobacco Use    Smoking status: Never    Smokeless tobacco: Never   Vaping Use    Vaping status: Never Used   Substance Use Topics    Alcohol use: No     Comment: stopped drinking alcohol    Drug use: No       MEDICATIONS:    Current Outpatient Medications:     BIOTIN PO, Take by mouth, Disp: , Rfl:     Calcium Ascorbate (VITAMIN C) 500 mg tablet, Take 1 tablet (500 mg total) by mouth daily, Disp: 90 tablet, Rfl: 3    Cholecalciferol (Vitamin D) 50 MCG (2000 UT) CAPS, Take 2 capsules (4,000 Units total) by mouth daily, Disp: 180 capsule, Rfl: 3    EPINEPHrine (EPIPEN) 0.3 mg/0.3 mL SOAJ, Inject 0.3 mL (0.3 mg total) into a muscle once for 1 dose, Disp: 2 each, Rfl: 1    Ferrous Sulfate (IRON PO), Take by mouth, Disp: , Rfl:     Levonorgestrel (MIRENA) 20 MCG/DAY IUD, 1 each by Intrauterine route once, Disp: , Rfl:     Multiple Vitamins-Minerals (Multivitamin Adult) CHEW, Chew 1 tablet in the morning, Disp: 90 tablet, Rfl: 1    TURMERIC PO, Take by mouth, Disp: , Rfl:     vitamin B-12 (VITAMIN B-12) 1,000 mcg tablet, TAKE ONE TABLET BY MOUTH EVERY DAY, Disp: 90 tablet, Rfl: 0    ergocalciferol (VITAMIN D2) 50,000 units, Take 1 capsule (50,000 Units total) by mouth once a week for 8 doses (Patient not taking: Reported on 2/6/2025), Disp: 8 capsule, Rfl: 0    ALLERGIES:  Allergies   Allergen Reactions    [place] Flavor - Food Allergy Anaphylaxis and Facial Swelling           REVIEW OF SYSTEMS:  Review of Systems   Constitutional:  Negative for chills, fever and unexpected weight change.   HENT:  Negative for hearing loss, nosebleeds and sore throat.    Eyes:  Negative for pain, redness and  visual disturbance.   Respiratory:  Negative for cough, shortness of breath and wheezing.    Cardiovascular:  Negative for chest pain, palpitations and leg swelling.   Gastrointestinal:  Negative for abdominal pain, nausea and vomiting.   Endocrine: Negative for polydipsia and polyuria.   Genitourinary:  Negative for difficulty urinating and hematuria.   Musculoskeletal:  Positive for arthralgias. Negative for joint swelling and myalgias.   Skin:  Negative for rash and wound.   Neurological:  Positive for numbness. Negative for dizziness and headaches.   Psychiatric/Behavioral:  Negative for decreased concentration, dysphoric mood and suicidal ideas. The patient is not nervous/anxious.        VITALS:  There were no vitals filed for this visit.    LABS:      _____________________________________________________  PHYSICAL EXAMINATION:  General: well developed and well nourished, alert, oriented times 3, and appears comfortable  Psychiatric: Normal  HEENT: Normocephalic, Atraumatic Trachea Midline, No torticollis  Pulmonary: No audible wheezing or respiratory distress   Abdomen/GI: Non tender, non distended   Cardiovascular: No pitting edema, 2+ radial pulse   Skin: No masses, erythema, lacerations, fluctation, ulcerations  Neurovascular: Sensation Intact to the Median, Ulnar, Radial Nerve, Motor Intact to the Median, Ulnar, Radial Nerve, and Pulses Intact  Musculoskeletal: Normal, except as noted in detailed exam and in HPI.      MUSCULOSKELETAL EXAMINATION:    Bilateral CMC Exam:  No adduction contracture  No hyperextension deformity of MCP joint  Positive localized tenderness over radial and dorsal aspect of thumb (CMC joint)  Metacarpal load shift test positive   No triggering or tenderness over the A1 pulley  Mild 1st dorsal compartment tenderness bilaterally        Bilateral Carpal Tunnel Exam:  Negative thenar atrophy. Positive phalen's test bilaterally. Negative carpal tunnel compression. Negative tinels over  median nerve at the wrist on the left, + on the right.  Opposition strength 5/5.  Abduction strength 5/5.      ___________________________________________________  STUDIES REVIEWED:  I have personally reviewed and interpreted  AP lateral and oblique radiographs of bilateral hands which demonstrate CMC arthrosis.        LABS REVIEWED:    HgA1c:   Lab Results   Component Value Date    HGBA1C 6.1 (H) 06/29/2024     BMP:   Lab Results   Component Value Date    GLUCOSE 94 11/07/2015    CALCIUM 8.9 06/29/2024     11/07/2015    K 4.1 06/29/2024    CO2 27 06/29/2024     06/29/2024    BUN 10 06/29/2024    CREATININE 0.56 (L) 06/29/2024               PROCEDURES PERFORMED:  Small joint arthrocentesis: bilateral thumb CMC  [place] Protocol:  procedure performed by consultantConsent: Verbal consent obtained. Written consent not obtained.  Consent given by: patient  Patient understanding: patient states understanding of the procedure being performed  Site marked: the operative site was marked  Patient identity confirmed: verbally with patient  Supporting Documentation  Indications: pain   Procedure Details  Location: thumb - bilateral thumb CMC  Needle size: 25 G  Ultrasound guidance: no    Medications (Right): 0.5 mL bupivacaine 0.25 %; 1 mL lidocaine 1 %; 20 mg triamcinolone acetonide 40 mg/mLMedications (Left): 0.5 mL bupivacaine 0.25 %; 1 mL lidocaine 1 %; 20 mg triamcinolone acetonide 40 mg/mL   Patient tolerance: patient tolerated the procedure well with no immediate complications  Dressing:  Sterile dressing applied           _____________________________________________________      Scribe Attestation      I,:  Haylee Davis MA am acting as a scribe while in the presence of the attending physician.:       I,:  Debra Wayne MD personally performed the services described in this documentation    as scribed in my presence.:

## 2025-02-06 NOTE — ASSESSMENT & PLAN NOTE
The etiology of carpal tunnel syndrome was discussed along with treatment options. She has tried bracing, which did make her symptoms worse. US of bilateral wrists were ordered to further evaluate for carpal tunnel syndrome. We discussed the US will not grade severity of compression. We discussed the possibility of carpal tunnel CSI's vs a carpal tunnel release.   Orders:    US Carpal Tunnel; Future

## 2025-03-20 ENCOUNTER — HOSPITAL ENCOUNTER (OUTPATIENT)
Dept: ULTRASOUND IMAGING | Facility: HOSPITAL | Age: 55
Discharge: HOME | End: 2025-03-20
Attending: SURGERY
Payer: COMMERCIAL

## 2025-03-20 DIAGNOSIS — G56.03 CARPAL TUNNEL SYNDROME, BILATERAL: ICD-10-CM

## 2025-03-20 PROCEDURE — 76882 US LMTD JT/FCL EVL NVASC XTR: CPT

## 2025-03-27 ENCOUNTER — OFFICE VISIT (OUTPATIENT)
Dept: OBGYN CLINIC | Facility: CLINIC | Age: 55
End: 2025-03-27
Payer: COMMERCIAL

## 2025-03-27 VITALS — BODY MASS INDEX: 40.66 KG/M2 | HEIGHT: 70 IN | WEIGHT: 284 LBS

## 2025-03-27 DIAGNOSIS — G56.02 CARPAL TUNNEL SYNDROME ON LEFT: ICD-10-CM

## 2025-03-27 DIAGNOSIS — M18.0 ARTHRITIS OF CARPOMETACARPAL (CMC) JOINT OF BOTH THUMBS: Primary | ICD-10-CM

## 2025-03-27 PROCEDURE — 99213 OFFICE O/P EST LOW 20 MIN: CPT | Performed by: SURGERY

## 2025-03-27 PROCEDURE — 20526 THER INJECTION CARP TUNNEL: CPT | Performed by: SURGERY

## 2025-03-27 RX ORDER — LIDOCAINE HYDROCHLORIDE 10 MG/ML
1 INJECTION, SOLUTION INFILTRATION; PERINEURAL
Status: COMPLETED | OUTPATIENT
Start: 2025-03-27 | End: 2025-03-27

## 2025-03-27 RX ORDER — DEXAMETHASONE SODIUM PHOSPHATE 10 MG/ML
40 INJECTION, SOLUTION INTRAMUSCULAR; INTRAVENOUS
Status: COMPLETED | OUTPATIENT
Start: 2025-03-27 | End: 2025-03-27

## 2025-03-27 RX ORDER — BUPIVACAINE HYDROCHLORIDE 2.5 MG/ML
0.5 INJECTION, SOLUTION INFILTRATION; PERINEURAL
Status: COMPLETED | OUTPATIENT
Start: 2025-03-27 | End: 2025-03-27

## 2025-03-27 RX ADMIN — BUPIVACAINE HYDROCHLORIDE 0.5 ML: 2.5 INJECTION, SOLUTION INFILTRATION; PERINEURAL at 10:30

## 2025-03-27 RX ADMIN — DEXAMETHASONE SODIUM PHOSPHATE 40 MG: 10 INJECTION, SOLUTION INTRAMUSCULAR; INTRAVENOUS at 10:30

## 2025-03-27 RX ADMIN — LIDOCAINE HYDROCHLORIDE 1 ML: 10 INJECTION, SOLUTION INFILTRATION; PERINEURAL at 10:30

## 2025-03-27 NOTE — PROGRESS NOTES
ASSESSMENT/PLAN:      Assessment & Plan  Arthritis of carpometacarpal (CMC) joint of both thumbs  Thumb pain has improved following CMC CSI's at her last visit.   The CSI's may be repeated on a 3 month basis as needed.  I will see her back in the office on an as needed basis.         Carpal tunnel syndrome on left  US results were reviewed, suspicions for carpal tunnel syndrome on the left, no evidence of carpal tunnel syndrome on the right.   The decision was made to proceed with a left carpal tunnel CSI.   Left carpal tunnel CSI was performed in the office without complication. Post injection protocol/expectations were reviewed.   If she does see good benefit from the left carpal tunnel CSI, we discussed a right carpal tunnel CSI may be performed for diagnostic as well as therapeutic purposes.   I will see her back in the office on an as needed basis.            The patient verbalized understanding of exam findings and treatment plan. We engaged in the shared decision-making process and treatment options were discussed at length with the patient. Surgical and conservative management discussed today along with risks and benefits.    Diagnoses and all orders for this visit:    Arthritis of carpometacarpal (CMC) joint of both thumbs    Carpal tunnel syndrome on left      Follow Up:  Return if symptoms worsen or fail to improve.      To Do Next Visit:  Re-evaluation of current issue    ____________________________________________________________________________________________________________________________________________      CHIEF COMPLAINT:  Chief Complaint   Patient presents with    Follow-up     MSK review       SUBJECTIVE:  Sonia Aguilar is a 54 y.o. year old RHD female who presents to the office for a follow up regarding bilateral thumb CMC arthritis and carpal tunnel syndrome. She underwent bilateral thumb CMC CSI's on 2/6/25, which were beneficial for her. She notes numbness and tingling to both hands,  index, long and ring fingers. Left side is worse then her right. She tried nighttime bracing but this did make numbness and tingling worse.         I have personally reviewed all the relevant PMH, PSH, SH, FH, Medications and allergies.     PAST MEDICAL HISTORY:  Past Medical History:   Diagnosis Date    Anemia     Basal cell carcinoma      Left forearm 4/01/2019    Diverticulitis of colon     Hypertension     Iron deficiency     Long term use of drug     resolved 11/27/15    PONV (postoperative nausea and vomiting)     Pt requests to be pre-medicated for N/V prior to sx    Shingles     Umbilical hernia     Varicella        PAST SURGICAL HISTORY:  Past Surgical History:   Procedure Laterality Date    CHOLECYSTECTOMY  06/11/2021    GASTRIC BYPASS  03/29/2011    May-en-Y bypass    HERNIA REPAIR  06/11/2021    KNEE SURGERY Left     arthroscopic knee surgery    WA LAPAROSCOPY SURG CHOLECYSTECTOMY N/A 05/21/2021    Procedure: LAPAROSCOPIC CHOLECYSTECTOMY;  Surgeon: Milton Felton MD;  Location: [facility];  Service: General    WA RPR UMBILICAL HRNA 5 YRS/> REDUCIBLE N/A 05/21/2021    Procedure: UMBILICAL HERNIA REPAIR, REMOVAL OF UMBILICUS;  Surgeon: Milton Felton MD;  Location[facility];  Service: General    TONSILLECTOMY         FAMILY HISTORY:  Family History   Problem Relation Age of Onset    No Known Problems Mother     Autoimmune disease Father     Cancer Maternal Grandmother 79        unknown type    No Known Problems Maternal Grandfather     No Known Problems Paternal Grandmother     No Known Problems Paternal Grandfather     No Known Problems Maternal Aunt     No Known Problems Maternal Aunt     No Known Problems Maternal Aunt     No Known Problems Maternal Aunt     No Known Problems Maternal Aunt     No Known Problems Maternal Aunt     No Known Problems Maternal Aunt     No Known Problems Maternal Aunt     No Known Problems Maternal Aunt     No Known Problems Maternal Aunt     No Known Problems Paternal Aunt      Heart disease Neg Hx        SOCIAL HISTORY:  Social History     Tobacco Use    Smoking status: Never    Smokeless tobacco: Never   Vaping Use    Vaping status: Never Used   Substance Use Topics    Alcohol use: No     Comment: stopped drinking alcohol    Drug use: No       MEDICATIONS:    Current Outpatient Medications:     BIOTIN PO, Take by mouth, Disp: , Rfl:     Calcium Ascorbate (VITAMIN C) 500 mg tablet, Take 1 tablet (500 mg total) by mouth daily, Disp: 90 tablet, Rfl: 3    Cholecalciferol (Vitamin D) 50 MCG (2000 UT) CAPS, Take 2 capsules (4,000 Units total) by mouth daily, Disp: 180 capsule, Rfl: 3    Ferrous Sulfate (IRON PO), Take by mouth, Disp: , Rfl:     Levonorgestrel (MIRENA) 20 MCG/DAY IUD, 1 each by Intrauterine route once, Disp: , Rfl:     Multiple Vitamins-Minerals (Multivitamin Adult) CHEW, Chew 1 tablet in the morning, Disp: 90 tablet, Rfl: 1    TURMERIC PO, Take by mouth, Disp: , Rfl:     vitamin B-12 (VITAMIN B-12) 1,000 mcg tablet, TAKE ONE TABLET BY MOUTH EVERY DAY, Disp: 90 tablet, Rfl: 0    EPINEPHrine (EPIPEN) 0.3 mg/0.3 mL SOAJ, Inject 0.3 mL (0.3 mg total) into a muscle once for 1 dose, Disp: 2 each, Rfl: 1    ergocalciferol (VITAMIN D2) 50,000 units, Take 1 capsule (50,000 Units total) by mouth once a week for 8 doses (Patient not taking: Reported on 2/6/2025), Disp: 8 capsule, Rfl: 0    ALLERGIES:  Allergies   Allergen Reactions    Peach Flavor - Food Allergy Anaphylaxis and Facial Swelling       REVIEW OF SYSTEMS:  Review of Systems   Constitutional:  Negative for chills, fever and unexpected weight change.   HENT:  Negative for hearing loss, nosebleeds and sore throat.    Eyes:  Negative for pain, redness and visual disturbance.   Respiratory:  Negative for cough, shortness of breath and wheezing.    Cardiovascular:  Negative for chest pain, palpitations and leg swelling.   Gastrointestinal:  Negative for abdominal pain, nausea and vomiting.   Endocrine: Negative for polydipsia  and polyuria.   Genitourinary:  Negative for difficulty urinating and hematuria.   Musculoskeletal:  Negative for arthralgias, joint swelling and myalgias.   Skin:  Negative for rash and wound.   Neurological:  Positive for numbness. Negative for dizziness and headaches.   Psychiatric/Behavioral:  Negative for decreased concentration, dysphoric mood and suicidal ideas. The patient is not nervous/anxious.        VITALS:  There were no vitals filed for this visit.      _____________________________________________________  PHYSICAL EXAMINATION:  General: well developed and well nourished, alert, oriented times 3, and appears comfortable  Psychiatric: Normal  HEENT: Normocephalic, Atraumatic Trachea Midline, No torticollis  Pulmonary: No audible wheezing or respiratory distress   Cardiovascular: No pitting edema, 2+ radial pulse   Abdominal/GI: abdomen non tender, non distended   Skin: No masses, erythema, lacerations, fluctation, ulcerations  Neurovascular: Sensation Intact to the Median, Ulnar, Radial Nerve, Motor Intact to the Median, Ulnar, Radial Nerve, and Pulses Intact  Musculoskeletal: Normal, except as noted in detailed exam and in HPI.      MUSCULOSKELETAL EXAMINATION:    Bilateral Carpal Tunnel Exam:  Negative thenar atrophy. Positive phalen's test bilaterally. Negative carpal tunnel compression. Negative tinels over median nerve at the wrist on the left, + on the right.  Opposition strength 5/5.  Abduction strength 5/5.    ___________________________________________________    STUDIES REVIEWED:  I have personally reviewed and interpreted  US of bilateral wrists which demonstrate  RIGHT SIDE: There is no evidence of carpal tunnel syndrome. Bifid median nerve noting a persistent median artery. LEFT SIDE: Findings suspicious for carpal tunnel syndrome based on presence of hypervascularity within the median nerve.    LABS REVIEWED:    HgA1c:   Lab Results   Component Value Date    HGBA1C 6.1 (H) 06/29/2024      BMP:   Lab Results   Component Value Date    GLUCOSE 94 11/07/2015    CALCIUM 8.9 06/29/2024     11/07/2015    K 4.1 06/29/2024    CO2 27 06/29/2024     06/29/2024    BUN 10 06/29/2024    CREATININE 0.56 (L) 06/29/2024             PROCEDURES PERFORMED:  Hand/upper extremity injection: L carpal tunnel  [place] Protocol:  procedure performed by consultantConsent: Verbal consent obtained. Written consent not obtained.  Risks and benefits: risks, benefits and alternatives were discussed  Consent given by: patient  Patient understanding: patient states understanding of the procedure being performed  Site marked: the operative site was marked  Patient identity confirmed: verbally with patient  Supporting Documentation  Indications: pain   Procedure Details  Condition:carpal tunnel syndrome Site: L carpal tunnel   Needle size: 25 G  Ultrasound guidance: no  Medications administered: 0.5 mL bupivacaine 0.25 %; 1 mL lidocaine 1 %; 40 mg dexamethasone 100 mg/10 mL  Patient tolerance: patient tolerated the procedure well with no immediate complications  Dressing:  Sterile dressing applied         -    _____________________________________________________      Scribe Attestation      I,:  Haylee Davis MA am acting as a scribe while in the presence of the attending physician.:       I,:  Debra Wayne MD personally performed the services described in this documentation    as scribed in my presence.:

## 2025-03-27 NOTE — ASSESSMENT & PLAN NOTE
US results were reviewed, suspicions for carpal tunnel syndrome on the left, no evidence of carpal tunnel syndrome on the right.   The decision was made to proceed with a left carpal tunnel CSI.   Left carpal tunnel CSI was performed in the office without complication. Post injection protocol/expectations were reviewed.   If she does see good benefit from the left carpal tunnel CSI, we discussed a right carpal tunnel CSI may be performed for diagnostic as well as therapeutic purposes.   I will see her back in the office on an as needed basis.

## 2025-03-27 NOTE — ASSESSMENT & PLAN NOTE
Thumb pain has improved following CMC CSI's at her last visit.   The CSI's may be repeated on a 3 month basis as needed.  I will see her back in the office on an as needed basis.

## 2025-07-18 ENCOUNTER — ESTABLISHED COMPREHENSIVE EXAM (OUTPATIENT)
Dept: URBAN - METROPOLITAN AREA CLINIC 6 | Facility: CLINIC | Age: 55
End: 2025-07-18

## 2025-07-18 DIAGNOSIS — Z98.890: ICD-10-CM

## 2025-07-18 DIAGNOSIS — H25.813: ICD-10-CM

## 2025-07-18 DIAGNOSIS — H04.123: ICD-10-CM

## 2025-07-18 DIAGNOSIS — H35.413: ICD-10-CM

## 2025-07-18 PROCEDURE — 92250 FUNDUS PHOTOGRAPHY W/I&R: CPT

## 2025-07-18 PROCEDURE — 92014 COMPRE OPH EXAM EST PT 1/>: CPT

## 2025-07-18 ASSESSMENT — VISUAL ACUITY
OD_SC: 20/20
OS_SC: 20/20

## 2025-07-18 ASSESSMENT — TONOMETRY
OD_IOP_MMHG: 11
OS_IOP_MMHG: 10

## 2025-07-22 NOTE — PROGRESS NOTES
Adult Annual Physical  Name: Sonia Aguilar      : 1970      MRN: [id number]  Encounter Provider: Jerad Alvarado DO  Encounter Date: 2025   Encounter department: [facility] INTERNAL MEDICINE    :  Assessment & Plan  Hyperlipidemia, unspecified hyperlipidemia type  Previous lipid profile stable.  ASCVD risk 1.4%.  Discussed dietary and exercise modifications today.  Continue monitoring with lipid profile.  Orders:    Lipid panel; Future    Pre-diabetes  Dietary and exercise modifications discussed.  Repeat A1c ordered.  Orders:    Hemoglobin A1C; Future    Vitamin B12 deficiency  Patient currently taking vitamin B12 supplementation with 1000 mcg daily orally.  Recheck B12 levels.  Orders:    Vitamin B12; Future    Vitamin D deficiency  History of vitamin D deficiency currently using over-the-counter cholecalciferol and 4000 units daily.  Required high-dose 50,000 units weekly prescription in the past.  Vitamin D levels ordered.  Orders:    Vitamin D 25 hydroxy; Future    Anemia, unspecified type  History of anemia.  Has been stable recently.  Continue monitoring with routine blood work.      Orders:    CBC and differential; Future    Comprehensive metabolic panel; Future    Cervicogenic headache  Patient states that over the past 2 years she has had pain in bilateral occipital portions of back of head.  States that these headaches seem to be situational.  If she has pressure applied to the base of the skull bilaterally she will have pain on either side.  Seems consistent with cervicogenic headache.  Typically only occurs when pain is present.  Does not occur otherwise.  Response to over-the-counter medications if need be.  Can trial treatment with cyclobenzaprine prior to bed to see if that might help relieve muscle aches and pains and thus relieve headache.  Orders:    cyclobenzaprine (FLEXERIL) 10 mg tablet; Take 1 tablet (10 mg total) by mouth daily at bedtime    Annual physical exam  As  part of patient's encounter today an annual physical exam was performed.    Reviewed patient's past medical history, medications, allergies, immunizations.    Reviewed patient's general health including diet, exercise, sleep, vision, dental, hearing and provided patient with recommendations to improve and maintain these aspects.    Reviewed patient's recent blood work and screenings and placed new orders as indicated.   Comprehensive physical examination was performed today.       Need for zoster vaccine  Prescription for shingles vaccine to be taken to pharmacy provided to patient today.                Preventive Screenings:    - Cholesterol Screening: screening not indicated and has hyperlipidemia   - Hepatitis C screening: risks/benefits discussed and patient declines   - HIV screening: risks/benefits discussed and patient declines   - Cervical cancer screening: screening up-to-date   - Breast cancer screening: screening up-to-date   - Colon cancer screening: screening up-to-date   - Lung cancer screening: screening not indicated     Immunizations:  - Immunizations due: Prevnar 20 and Zoster (Shingrix)    Counseling/Anticipatory Guidance:    - Diet: discussed recommendations for a healthy/well-balanced diet.   - Exercise: the importance of regular exercise/physical activity was discussed. Recommend exercise 3-5 times per week for at least 30 minutes.          History of Present Illness     Adult Annual Physical:  Patient presents for annual physical. Patient is a 55-year-old female with history of diverticulosis, bilateral carpal tunnel syndrome, anemia and leukocytosis, hyperlipidemia, iron deficiency, B12 deficiency, vitamin D deficiency, prediabetes.  She presents to clinic today for annual physical examination.  Her previous primary care physician was a [facility] internal medicine physician and was since left the practice.  She was last seen in office by her previous PCP on 07/19/2024.    As part of  "patient's encounter today an annual physical exam was performed.  Reviewed patient's past medical history, medications, allergies, immunizations.  Reviewed patient's general health including diet, exercise, sleep, vision, dental, hearing and provided patient with recommendations to improve and maintain these aspects.  Reviewed patient's recent blood work and screenings and placed new orders as indicated. Comprehensive physical examination was performed today. .     Diet and Physical Activity:  - Diet/Nutrition: no special diet.  - Exercise: no formal exercise.    General Health:  - Sleep: 4-6 hours of sleep on average.  - Hearing: normal hearing bilateral ears.  - Vision: most recent eye exam < 1 year ago and previous LASIK surgery.  - Dental: regular dental visits.    /GYN Health:  - Follows with GYN: yes.   - History of STDs: no    Advanced Care Planning:  - Has an advanced directive?: no    - Has a durable medical POA?: no    - ACP document given to patient?: no      Review of Systems   Constitutional:  Negative for chills and fever.   HENT:  Negative for congestion, rhinorrhea and sore throat.    Respiratory:  Negative for cough, shortness of breath and wheezing.    Cardiovascular:  Negative for chest pain and leg swelling.   Gastrointestinal:  Negative for abdominal distention, abdominal pain, constipation, diarrhea, nausea and vomiting.   Genitourinary:  Negative for difficulty urinating and dysuria.   Musculoskeletal:  Negative for arthralgias and back pain.   Skin:  Negative for rash and wound.   Neurological:  Negative for dizziness, syncope, weakness, light-headedness and headaches.   Psychiatric/Behavioral:  Negative for agitation, behavioral problems and confusion.          Objective   /78   Pulse 77   Temp (!) 96.8 °F (36 °C)   Resp 18   Ht 5' 9.5\" (1.765 m)   Wt 130 kg (287 lb)   SpO2 99%   BMI 41.77 kg/m²     Physical Exam  Constitutional:       Appearance: Normal appearance.   HENT:    "   Right Ear: External ear normal.      Left Ear: External ear normal.     Cardiovascular:      Rate and Rhythm: Normal rate and regular rhythm.      Pulses: Normal pulses.      Heart sounds: Normal heart sounds. No murmur heard.  Pulmonary:      Effort: Pulmonary effort is normal. No respiratory distress.      Breath sounds: Normal breath sounds. No wheezing, rhonchi or rales.   Abdominal:      General: Abdomen is flat. Bowel sounds are normal. There is no distension.      Palpations: Abdomen is soft.      Tenderness: There is no abdominal tenderness.     Musculoskeletal:      Right lower leg: No edema.      Left lower leg: No edema.     Skin:     General: Skin is warm and dry.     Neurological:      Mental Status: She is alert and oriented to person, place, and time.     Psychiatric:         Mood and Affect: Mood normal.         Behavior: Behavior normal.         Thought Content: Thought content normal.

## 2025-07-22 NOTE — PATIENT INSTRUCTIONS
"Patient Education     Routine physical for adults   The Basics   Written by the doctors and editors at [facility]   What is a physical? -- A physical is a routine visit, or \"check-up,\" with your doctor. You might also hear it called a \"wellness visit\" or \"preventive visit.\"  During each visit, the doctor will:   Ask about your physical and mental health   Ask about your habits, behaviors, and lifestyle   Do an exam   Give you vaccines if needed   Talk to you about any medicines you take   Give advice about your health   Answer your questions  Getting regular check-ups is an important part of taking care of your health. It can help your doctor find and treat any problems you have. But it's also important for preventing health problems.  A routine physical is different from a \"sick visit.\" A sick visit is when you see a doctor because of a health concern or problem. Since physicals are scheduled ahead of time, you can think about what you want to ask the doctor.  How often should I get a physical? -- It depends on your age and health. In general, for people age 21 years and older:   If you are younger than 50 years, you might be able to get a physical every 3 years.   If you are 50 years or older, your doctor might recommend a physical every year.  If you have an ongoing health condition, like diabetes or high blood pressure, your doctor will probably want to see you more often.  What happens during a physical? -- In general, each visit will include:   Physical exam - The doctor or nurse will check your height, weight, heart rate, and blood pressure. They will also look at your eyes and ears. They will ask about how you are feeling and whether you have any symptoms that bother you.   Medicines - It's a good idea to bring a list of all the medicines you take to each doctor visit. Your doctor will talk to you about your medicines and answer any questions. Tell them if you are having any side effects that bother you. You " "should also tell them if you are having trouble paying for any of your medicines.   Habits and behaviors - This includes:   Your diet   Your exercise habits   Whether you smoke, drink alcohol, or use drugs   Whether you are sexually active   Whether you feel safe at home  Your doctor will talk to you about things you can do to improve your health and lower your risk of health problems. They will also offer help and support. For example, if you want to quit smoking, they can give you advice and might prescribe medicines. If you want to improve your diet or get more physical activity, they can help you with this, too.   Lab tests, if needed - The tests you get will depend on your age and situation. For example, your doctor might want to check your:   Cholesterol   Blood sugar   Iron level   Vaccines - The recommended vaccines will depend on your age, health, and what vaccines you already had. Vaccines are very important because they can prevent certain serious or deadly infections.   Discussion of screening - \"Screening\" means checking for diseases or other health problems before they cause symptoms. Your doctor can recommend screening based on your age, risk, and preferences. This might include tests to check for:   Cancer, such as breast, prostate, cervical, ovarian, colorectal, prostate, lung, or skin cancer   Sexually transmitted infections, such as chlamydia and gonorrhea   Mental health conditions like depression and anxiety  Your doctor will talk to you about the different types of screening tests. They can help you decide which screenings to have. They can also explain what the results might mean.   Answering questions - The physical is a good time to ask the doctor or nurse questions about your health. If needed, they can refer you to other doctors or specialists, too.  Adults older than 65 years often need other care, too. As you get older, your doctor will talk to you about:   How to prevent falling at " home   Hearing or vision tests   Memory testing   How to take your medicines safely   Making sure that you have the help and support you need at home  All topics are updated as new evidence becomes available and our peer review process is complete.  This topic retrieved from [employer] on: May 02, 2024.  Topic 259965 Version 1.0  Release: 32.4.3 - C32.122  © 2024 UpToDate, Inc. and/or its affiliates. All rights reserved.  Consumer Information Use and Disclaimer   Disclaimer: This generalized information is a limited summary of diagnosis, treatment, and/or medication information. It is not meant to be comprehensive and should be used as a tool to help the user understand and/or assess potential diagnostic and treatment options. It does NOT include all information about conditions, treatments, medications, side effects, or risks that may apply to a specific patient. It is not intended to be medical advice or a substitute for the medical advice, diagnosis, or treatment of a health care provider based on the health care provider's examination and assessment of a patient's specific and unique circumstances. Patients must speak with a health care provider for complete information about their health, medical questions, and treatment options, including any risks or benefits regarding use of medications. This information does not endorse any treatments or medications as safe, effective, or approved for treating a specific patient. UpToDate, Inc. and its affiliates disclaim any warranty or liability relating to this information or the use thereof.The use of this information is governed by the Terms of Use, available at https://www.wolters[employer] Embraceuwer.com/en/know/clinical-effectiveness-terms. 2024© UpToDate, Inc. and its affiliates and/or licensors. All rights reserved.  Copyright   © 2024 UpToDate, Inc. and/or its affiliates. All rights reserved.

## 2025-07-23 ENCOUNTER — OFFICE VISIT (OUTPATIENT)
Age: 55
End: 2025-07-23
Payer: COMMERCIAL

## 2025-07-23 VITALS
RESPIRATION RATE: 18 BRPM | OXYGEN SATURATION: 99 % | HEART RATE: 77 BPM | BODY MASS INDEX: 41.09 KG/M2 | WEIGHT: 287 LBS | DIASTOLIC BLOOD PRESSURE: 78 MMHG | SYSTOLIC BLOOD PRESSURE: 130 MMHG | HEIGHT: 70 IN | TEMPERATURE: 96.8 F

## 2025-07-23 DIAGNOSIS — Z23 NEED FOR ZOSTER VACCINE: ICD-10-CM

## 2025-07-23 DIAGNOSIS — D64.9 ANEMIA, UNSPECIFIED TYPE: Chronic | ICD-10-CM

## 2025-07-23 DIAGNOSIS — Z00.00 ANNUAL PHYSICAL EXAM: ICD-10-CM

## 2025-07-23 DIAGNOSIS — E55.9 VITAMIN D DEFICIENCY: Chronic | ICD-10-CM

## 2025-07-23 DIAGNOSIS — E78.5 HYPERLIPIDEMIA, UNSPECIFIED HYPERLIPIDEMIA TYPE: Primary | Chronic | ICD-10-CM

## 2025-07-23 DIAGNOSIS — G44.86 CERVICOGENIC HEADACHE: ICD-10-CM

## 2025-07-23 DIAGNOSIS — E53.8 VITAMIN B12 DEFICIENCY: Chronic | ICD-10-CM

## 2025-07-23 DIAGNOSIS — R73.03 PRE-DIABETES: Chronic | ICD-10-CM

## 2025-07-23 PROBLEM — R10.9 ABDOMINAL PAIN: Status: RESOLVED | Noted: 2021-04-12 | Resolved: 2025-07-23

## 2025-07-23 PROCEDURE — 99204 OFFICE O/P NEW MOD 45 MIN: CPT | Performed by: STUDENT IN AN ORGANIZED HEALTH CARE EDUCATION/TRAINING PROGRAM

## 2025-07-23 PROCEDURE — 99396 PREV VISIT EST AGE 40-64: CPT | Performed by: STUDENT IN AN ORGANIZED HEALTH CARE EDUCATION/TRAINING PROGRAM

## 2025-07-23 RX ORDER — ZOSTER VACCINE RECOMBINANT, ADJUVANTED 50 MCG/0.5
0.5 KIT INTRAMUSCULAR ONCE
Qty: 1 EACH | Refills: 1 | Status: SHIPPED | OUTPATIENT
Start: 2025-07-23 | End: 2025-07-23 | Stop reason: SDUPTHER

## 2025-07-23 RX ORDER — CYCLOBENZAPRINE HCL 10 MG
10 TABLET ORAL
Qty: 30 TABLET | Refills: 0 | Status: SHIPPED | OUTPATIENT
Start: 2025-07-23

## 2025-07-23 RX ORDER — ZOSTER VACCINE RECOMBINANT, ADJUVANTED 50 MCG/0.5
0.5 KIT INTRAMUSCULAR ONCE
Qty: 1 EACH | Refills: 1 | Status: SHIPPED | OUTPATIENT
Start: 2025-07-23 | End: 2025-07-23

## 2025-07-23 NOTE — ASSESSMENT & PLAN NOTE
Previous lipid profile stable.  ASCVD risk 1.4%.  Discussed dietary and exercise modifications today.  Continue monitoring with lipid profile.  Orders:    Lipid panel; Future

## 2025-07-23 NOTE — ASSESSMENT & PLAN NOTE
History of anemia.  Has been stable recently.  Continue monitoring with routine blood work.      Orders:    CBC and differential; Future    Comprehensive metabolic panel; Future

## 2025-07-23 NOTE — ASSESSMENT & PLAN NOTE
Patient currently taking vitamin B12 supplementation with 1000 mcg daily orally.  Recheck B12 levels.  Orders:    Vitamin B12; Future

## 2025-07-23 NOTE — ASSESSMENT & PLAN NOTE
History of vitamin D deficiency currently using over-the-counter cholecalciferol and 4000 units daily.  Required high-dose 50,000 units weekly prescription in the past.  Vitamin D levels ordered.  Orders:    Vitamin D 25 hydroxy; Future

## 2025-07-23 NOTE — ASSESSMENT & PLAN NOTE
Dietary and exercise modifications discussed.  Repeat A1c ordered.  Orders:    Hemoglobin A1C; Future

## 2025-08-17 ENCOUNTER — APPOINTMENT (OUTPATIENT)
Dept: LAB | Facility: CLINIC | Age: 55
End: 2025-08-17
Payer: COMMERCIAL

## 2025-08-17 ENCOUNTER — APPOINTMENT (OUTPATIENT)
Dept: LAB | Facility: CLINIC | Age: 55
End: 2025-08-17
Attending: PREVENTIVE MEDICINE
Payer: COMMERCIAL

## 2025-08-17 DIAGNOSIS — E55.9 VITAMIN D DEFICIENCY: Chronic | ICD-10-CM

## 2025-08-17 DIAGNOSIS — Z00.8 ENCOUNTER FOR OTHER GENERAL EXAMINATION: ICD-10-CM

## 2025-08-17 DIAGNOSIS — E53.8 VITAMIN B12 DEFICIENCY: Chronic | ICD-10-CM

## 2025-08-17 DIAGNOSIS — D64.9 ANEMIA, UNSPECIFIED TYPE: Chronic | ICD-10-CM

## 2025-08-17 DIAGNOSIS — E78.5 HYPERLIPIDEMIA, UNSPECIFIED HYPERLIPIDEMIA TYPE: Chronic | ICD-10-CM

## 2025-08-17 LAB
25(OH)D3 SERPL-MCNC: 58 NG/ML (ref 30–100)
ALBUMIN SERPL BCG-MCNC: 3.8 G/DL (ref 3.5–5)
ALP SERPL-CCNC: 95 U/L (ref 34–104)
ALT SERPL W P-5'-P-CCNC: 12 U/L (ref 7–52)
ANION GAP SERPL CALCULATED.3IONS-SCNC: 6 MMOL/L (ref 4–13)
AST SERPL W P-5'-P-CCNC: 14 U/L (ref 13–39)
BASOPHILS # BLD AUTO: 0.05 THOUSANDS/ÂΜL (ref 0–0.1)
BASOPHILS NFR BLD AUTO: 1 % (ref 0–1)
BILIRUB SERPL-MCNC: 0.71 MG/DL (ref 0.2–1)
BUN SERPL-MCNC: 12 MG/DL (ref 5–25)
CALCIUM SERPL-MCNC: 9 MG/DL (ref 8.4–10.2)
CHLORIDE SERPL-SCNC: 105 MMOL/L (ref 96–108)
CHOLEST SERPL-MCNC: 175 MG/DL (ref ?–200)
CO2 SERPL-SCNC: 27 MMOL/L (ref 21–32)
CREAT SERPL-MCNC: 0.64 MG/DL (ref 0.6–1.3)
EOSINOPHIL # BLD AUTO: 0.1 THOUSAND/ÂΜL (ref 0–0.61)
EOSINOPHIL NFR BLD AUTO: 1 % (ref 0–6)
ERYTHROCYTE [DISTWIDTH] IN BLOOD BY AUTOMATED COUNT: 13.8 % (ref 11.6–15.1)
EST. AVERAGE GLUCOSE BLD GHB EST-MCNC: 134 MG/DL
GFR SERPL CREATININE-BSD FRML MDRD: 102 ML/MIN/1.73SQ M
GLUCOSE P FAST SERPL-MCNC: 121 MG/DL (ref 65–99)
HBA1C MFR BLD: 6.3 %
HCT VFR BLD AUTO: 44.6 % (ref 34.8–46.1)
HDLC SERPL-MCNC: 52 MG/DL
HGB BLD-MCNC: 14 G/DL (ref 11.5–15.4)
IMM GRANULOCYTES # BLD AUTO: 0.03 THOUSAND/UL (ref 0–0.2)
IMM GRANULOCYTES NFR BLD AUTO: 0 % (ref 0–2)
LDLC SERPL CALC-MCNC: 101 MG/DL (ref 0–100)
LYMPHOCYTES # BLD AUTO: 2.32 THOUSANDS/ÂΜL (ref 0.6–4.47)
LYMPHOCYTES NFR BLD AUTO: 28 % (ref 14–44)
MCH RBC QN AUTO: 28.3 PG (ref 26.8–34.3)
MCHC RBC AUTO-ENTMCNC: 31.4 G/DL (ref 31.4–37.4)
MCV RBC AUTO: 90 FL (ref 82–98)
MONOCYTES # BLD AUTO: 0.58 THOUSAND/ÂΜL (ref 0.17–1.22)
MONOCYTES NFR BLD AUTO: 7 % (ref 4–12)
NEUTROPHILS # BLD AUTO: 5.1 THOUSANDS/ÂΜL (ref 1.85–7.62)
NEUTS SEG NFR BLD AUTO: 63 % (ref 43–75)
NONHDLC SERPL-MCNC: 123 MG/DL
NRBC BLD AUTO-RTO: 0 /100 WBCS
PLATELET # BLD AUTO: 244 THOUSANDS/UL (ref 149–390)
PMV BLD AUTO: 12.7 FL (ref 8.9–12.7)
POTASSIUM SERPL-SCNC: 4.4 MMOL/L (ref 3.5–5.3)
PROT SERPL-MCNC: 6.7 G/DL (ref 6.4–8.4)
RBC # BLD AUTO: 4.94 MILLION/UL (ref 3.81–5.12)
SODIUM SERPL-SCNC: 138 MMOL/L (ref 135–147)
TRIGL SERPL-MCNC: 110 MG/DL (ref ?–150)
VIT B12 SERPL-MCNC: 882 PG/ML (ref 180–914)
WBC # BLD AUTO: 8.18 THOUSAND/UL (ref 4.31–10.16)

## 2025-08-17 PROCEDURE — 36415 COLL VENOUS BLD VENIPUNCTURE: CPT

## 2025-08-17 PROCEDURE — 82607 VITAMIN B-12: CPT

## 2025-08-17 PROCEDURE — 80061 LIPID PANEL: CPT

## 2025-08-17 PROCEDURE — 85025 COMPLETE CBC W/AUTO DIFF WBC: CPT

## 2025-08-17 PROCEDURE — 80053 COMPREHEN METABOLIC PANEL: CPT

## 2025-08-17 PROCEDURE — 82306 VITAMIN D 25 HYDROXY: CPT

## 2025-08-17 PROCEDURE — 83036 HEMOGLOBIN GLYCOSYLATED A1C: CPT

## (undated) DEVICE — TROCAR APPPLE 5MM EXTENDED LENGTH

## (undated) DEVICE — TOWEL SET X-RAY

## (undated) DEVICE — TUBING SMOKE EVAC W/FILTRATION DEVICE PLUMEPORT ACTIV

## (undated) DEVICE — TROCAR: Brand: KII FIOS FIRST ENTRY

## (undated) DEVICE — INSUFFLATION TUBING PRIMFLO

## (undated) DEVICE — TISSUE RETRIEVAL SYSTEM: Brand: INZII RETRIEVAL SYSTEM

## (undated) DEVICE — UNDYED BRAIDED (POLYGLACTIN 910), SYNTHETIC ABSORBABLE SUTURE: Brand: COATED VICRYL

## (undated) DEVICE — SUT MONOCRYL 4-0 PS-2 27 IN Y426H

## (undated) DEVICE — LIGAMAX 5 MM ENDOSCOPIC MULTIPLE CLIP APPLIER: Brand: LIGAMAX

## (undated) DEVICE — NEEDLE 22 G X 1 1/2 SAFETY

## (undated) DEVICE — INTENDED FOR TISSUE SEPARATION, AND OTHER PROCEDURES THAT REQUIRE A SHARP SURGICAL BLADE TO PUNCTURE OR CUT.: Brand: BARD-PARKER SAFETY BLADES SIZE 15, STERILE

## (undated) DEVICE — SUT VICRYL 2-0 REEL 54 IN J286G

## (undated) DEVICE — LIGHT HANDLE COVER SLEEVE DISP BLUE STELLAR

## (undated) DEVICE — GLOVE SRG BIOGEL ECLIPSE 7

## (undated) DEVICE — SUT MONOCRYL 4-0 PS-2 18 IN Y496G

## (undated) DEVICE — SUT VICRYL 3-0 SH 27 IN J416H

## (undated) DEVICE — ALLENTOWN LAP CHOLE APP PACK: Brand: CARDINAL HEALTH

## (undated) DEVICE — INTENDED FOR TISSUE SEPARATION, AND OTHER PROCEDURES THAT REQUIRE A SHARP SURGICAL BLADE TO PUNCTURE OR CUT.: Brand: BARD-PARKER SAFETY BLADES SIZE 11, STERILE

## (undated) DEVICE — DRAPE EQUIPMENT RF WAND

## (undated) DEVICE — STERILE SURGICAL LUBRICANT,  TUBE: Brand: SURGILUBE

## (undated) DEVICE — BETHLEHEM UNIVERSAL MINOR GEN: Brand: CARDINAL HEALTH

## (undated) DEVICE — PENCIL ELECTROSURG E-Z CLEAN -0035H

## (undated) DEVICE — CHLORAPREP HI-LITE 26ML ORANGE

## (undated) DEVICE — PAD GROUNDING ADULT

## (undated) DEVICE — VIAL DECANTER

## (undated) DEVICE — ELECTRODE LAP J HOOK SPLIT STEM E-Z CLEAN 33CM -0021S

## (undated) DEVICE — ADHESIVE SKIN HIGH VISCOSITY EXOFIN 1ML